# Patient Record
Sex: MALE | Race: WHITE
[De-identification: names, ages, dates, MRNs, and addresses within clinical notes are randomized per-mention and may not be internally consistent; named-entity substitution may affect disease eponyms.]

---

## 2021-05-24 ENCOUNTER — HOSPITAL ENCOUNTER (INPATIENT)
Dept: HOSPITAL 47 - EC | Age: 71
LOS: 2 days | Discharge: HOME | DRG: 309 | End: 2021-05-26
Attending: INTERNAL MEDICINE | Admitting: INTERNAL MEDICINE
Payer: MEDICARE

## 2021-05-24 DIAGNOSIS — Z85.01: ICD-10-CM

## 2021-05-24 DIAGNOSIS — R06.09: ICD-10-CM

## 2021-05-24 DIAGNOSIS — Z86.73: ICD-10-CM

## 2021-05-24 DIAGNOSIS — C15.9: ICD-10-CM

## 2021-05-24 DIAGNOSIS — Z92.3: ICD-10-CM

## 2021-05-24 DIAGNOSIS — Z79.899: ICD-10-CM

## 2021-05-24 DIAGNOSIS — R06.00: ICD-10-CM

## 2021-05-24 DIAGNOSIS — Z85.820: ICD-10-CM

## 2021-05-24 DIAGNOSIS — I48.21: Primary | ICD-10-CM

## 2021-05-24 DIAGNOSIS — E78.5: ICD-10-CM

## 2021-05-24 DIAGNOSIS — I10: ICD-10-CM

## 2021-05-24 DIAGNOSIS — I51.3: ICD-10-CM

## 2021-05-24 DIAGNOSIS — K21.9: ICD-10-CM

## 2021-05-24 DIAGNOSIS — Z85.6: ICD-10-CM

## 2021-05-24 DIAGNOSIS — Q85.00: ICD-10-CM

## 2021-05-24 DIAGNOSIS — Z20.822: ICD-10-CM

## 2021-05-24 DIAGNOSIS — Z79.01: ICD-10-CM

## 2021-05-24 LAB
ALBUMIN SERPL-MCNC: 3.5 G/DL (ref 3.5–5)
ALP SERPL-CCNC: 99 U/L (ref 38–126)
ALT SERPL-CCNC: 9 U/L (ref 4–49)
ANION GAP SERPL CALC-SCNC: 7 MMOL/L
APTT BLD: 26.5 SEC (ref 22–30)
AST SERPL-CCNC: 18 U/L (ref 17–59)
BASOPHILS # BLD MANUAL: 0.1 K/UL (ref 0–0.2)
BUN SERPL-SCNC: 11 MG/DL (ref 9–20)
CALCIUM SPEC-MCNC: 9 MG/DL (ref 8.4–10.2)
CELLS COUNTED: 100
CHLORIDE SERPL-SCNC: 102 MMOL/L (ref 98–107)
CO2 SERPL-SCNC: 25 MMOL/L (ref 22–30)
D DIMER PPP FEU-MCNC: 0.41 MG/L FEU (ref ?–0.6)
EOSINOPHIL # BLD MANUAL: 0.62 K/UL (ref 0–0.7)
ERYTHROCYTE [DISTWIDTH] IN BLOOD BY AUTOMATED COUNT: 3.59 M/UL (ref 4.3–5.9)
ERYTHROCYTE [DISTWIDTH] IN BLOOD: 16.3 % (ref 11.5–15.5)
GLUCOSE SERPL-MCNC: 111 MG/DL (ref 74–99)
HCT VFR BLD AUTO: 32.2 % (ref 39–53)
HGB BLD-MCNC: 10.8 GM/DL (ref 13–17.5)
INR PPP: 1 (ref ?–1.2)
LYMPHOCYTES # BLD MANUAL: 2.58 K/UL (ref 1–4.8)
MCH RBC QN AUTO: 30.1 PG (ref 25–35)
MCHC RBC AUTO-ENTMCNC: 33.6 G/DL (ref 31–37)
MCV RBC AUTO: 89.7 FL (ref 80–100)
MONOCYTES # BLD MANUAL: 0.31 K/UL (ref 0–1)
NEUTROPHILS NFR BLD MANUAL: 61 %
NEUTS SEG # BLD MANUAL: 6.6 K/UL (ref 1.3–7.7)
PLATELET # BLD AUTO: 251 K/UL (ref 150–450)
POTASSIUM SERPL-SCNC: 3.9 MMOL/L (ref 3.5–5.1)
PROT SERPL-MCNC: 6.1 G/DL (ref 6.3–8.2)
PT BLD: 10.9 SEC (ref 9–12)
SODIUM SERPL-SCNC: 134 MMOL/L (ref 137–145)
WBC # BLD AUTO: 10.3 K/UL (ref 3.8–10.6)

## 2021-05-24 PROCEDURE — 85379 FIBRIN DEGRADATION QUANT: CPT

## 2021-05-24 PROCEDURE — 85025 COMPLETE CBC W/AUTO DIFF WBC: CPT

## 2021-05-24 PROCEDURE — 85045 AUTOMATED RETICULOCYTE COUNT: CPT

## 2021-05-24 PROCEDURE — 83615 LACTATE (LD) (LDH) ENZYME: CPT

## 2021-05-24 PROCEDURE — 87635 SARS-COV-2 COVID-19 AMP PRB: CPT

## 2021-05-24 PROCEDURE — 83540 ASSAY OF IRON: CPT

## 2021-05-24 PROCEDURE — 83880 ASSAY OF NATRIURETIC PEPTIDE: CPT

## 2021-05-24 PROCEDURE — 83550 IRON BINDING TEST: CPT

## 2021-05-24 PROCEDURE — 80053 COMPREHEN METABOLIC PANEL: CPT

## 2021-05-24 PROCEDURE — 93005 ELECTROCARDIOGRAM TRACING: CPT

## 2021-05-24 PROCEDURE — 80048 BASIC METABOLIC PNL TOTAL CA: CPT

## 2021-05-24 PROCEDURE — 85730 THROMBOPLASTIN TIME PARTIAL: CPT

## 2021-05-24 PROCEDURE — 82746 ASSAY OF FOLIC ACID SERUM: CPT

## 2021-05-24 PROCEDURE — 83735 ASSAY OF MAGNESIUM: CPT

## 2021-05-24 PROCEDURE — 71046 X-RAY EXAM CHEST 2 VIEWS: CPT

## 2021-05-24 PROCEDURE — 83605 ASSAY OF LACTIC ACID: CPT

## 2021-05-24 PROCEDURE — 85610 PROTHROMBIN TIME: CPT

## 2021-05-24 PROCEDURE — 36415 COLL VENOUS BLD VENIPUNCTURE: CPT

## 2021-05-24 PROCEDURE — 99285 EMERGENCY DEPT VISIT HI MDM: CPT

## 2021-05-24 PROCEDURE — 71260 CT THORAX DX C+: CPT

## 2021-05-24 PROCEDURE — 83921 ORGANIC ACID SINGLE QUANT: CPT

## 2021-05-24 PROCEDURE — 93306 TTE W/DOPPLER COMPLETE: CPT

## 2021-05-24 PROCEDURE — 82728 ASSAY OF FERRITIN: CPT

## 2021-05-24 PROCEDURE — 84484 ASSAY OF TROPONIN QUANT: CPT

## 2021-05-24 PROCEDURE — 82607 VITAMIN B-12: CPT

## 2021-05-24 PROCEDURE — 80061 LIPID PANEL: CPT

## 2021-05-24 RX ADMIN — METOPROLOL TARTRATE SCH MG: 25 TABLET, FILM COATED ORAL at 22:31

## 2021-05-24 RX ADMIN — SODIUM CHLORIDE, PRESERVATIVE FREE SCH ML: 5 INJECTION INTRAVENOUS at 22:31

## 2021-05-24 RX ADMIN — RIVAROXABAN SCH MG: 20 TABLET, FILM COATED ORAL at 22:31

## 2021-05-24 NOTE — XR
EXAMINATION TYPE: XR chest 2V

 

DATE OF EXAM: 5/24/2021

 

COMPARISON: NONE

 

HISTORY: Difficulty in breathing.

 

TECHNIQUE:  Frontal and lateral views of the chest are obtained.

 

FINDINGS: Scattered round densities project over the bilateral lungs along with the bilateral thorax 
could reflect extradural or soft tissue lesions. Cannot exclude pulmonary nodules. There is chronic p
arenchymal change without focal air space opacity, pleural effusion, or pneumothorax seen.  The cardi
ac silhouette size is upper limits of normal.   The osseous structures are demineralized.

 

IMPRESSION:  No acute process. Cannot exclude bilateral pulmonary nodules. Consider chest CT to [facility]
er evaluate.

## 2021-05-24 NOTE — ED
General Adult HPI





- General


Chief complaint: Shortness of Breath


Stated complaint: SOB


Time Seen by Provider: 05/24/21 15:25


Source: patient, family, RN notes reviewed


Mode of arrival: wheelchair


Limitations: no limitations





- History of Present Illness


Initial comments: 





Patient is a pleasant 70-year-old male presenting to the emergency department 

with difficulty breathing.  Onset of symptoms was yesterday.  Symptoms are 

present with exertion.  No symptoms at rest.  No cough.  No fever.  No chest 

pain.  No leg pain or leg swelling.  Patient does have history of similar 

symptoms previously however is unclear why.  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

geal cancer with recent chemo and radiation.  Recent follow-up revealed little 

or no remaining cancer.  Patient did have a recent flight from [place] 1 week

ago.  Patient is able to walk 10 or 20 feet at this time before having to stop 

from dyspnea and exhaustion.





- Related Data


                                    Allergies











Allergy/AdvReac Type Severity Reaction Status Date / Time


 


No Known Allergies Allergy   Verified 05/24/21 14:49














Review of Systems


ROS Statement: 


Those systems with pertinent positive or pertinent negative responses have been 

documented in the HPI.





ROS Other: All systems not noted in ROS Statement are negative.


Constitutional: Denies: fever


Eyes: Denies: eye pain


ENT: Denies: ear pain


Respiratory: Reports: dyspnea.  Denies: cough


Cardiovascular: Denies: chest pain


Endocrine: Reports: fatigue


Gastrointestinal: Denies: abdominal pain


Genitourinary: Denies: dysuria


Musculoskeletal: Denies: back pain


Skin: Denies: rash


Neurological: Denies: weakness





Past Medical History


Past Medical History: Atrial Fibrillation, Cancer, CVA/TIA


Additional Past Medical History / Comment(s): neurofibormytosis, swallowing 

disorder, esophagial cancer, clots in left atrial appendage, CLL


History of Any Multi-Drug Resistant Organisms: None Reported


Past Surgical History: Bowel Resection


Additional Past Surgical History / Comment(s): peg tube, throat surgery for 

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


Past Psychological History: No Psychological Hx Reported


Smoking Status: Never smoker


Past Alcohol Use History: Occasional


Past Drug Use History: None Reported





General Exam


Limitations: no limitations


General appearance: alert, in no apparent distress


Head exam: Present: normocephalic


Eye exam: Present: normal appearance


Neck exam: Present: normal inspection


Respiratory exam: Present: normal lung sounds bilaterally


Cardiovascular Exam: Present: irregular rhythm


GI/Abdominal exam: Present: soft.  Absent: distended, tenderness


Extremities exam: Present: normal inspection.  Absent: pedal edema, calf 

tenderness


Neurological exam: Present: alert


Psychiatric exam: Present: normal affect, normal mood


Skin exam: Present: other (Diffuse skin nodules consistent assistant with 

patient's history of neurofibromatosis)





Course


                                   Vital Signs











  05/24/21 05/24/21 05/24/21





  14:49 16:00 16:38


 


Temperature 97.5 F L  


 


Pulse Rate 75 69 54 L


 


Respiratory 18 18 18





Rate   


 


Blood Pressure 96/60 109/71 105/70


 


O2 Sat by Pulse 99 100 100





Oximetry   














  05/24/21 05/24/21





  16:57 17:57


 


Temperature  


 


Pulse Rate  81


 


Respiratory 18 18





Rate  


 


Blood Pressure  107/86


 


O2 Sat by Pulse  100





Oximetry  














EKG Findings





- EKG Comments:


EKG Findings:: A. fib with rate of 79.  QRS 82.  .  .  Normal axis.

 Normal QRS.  ST depression V2 through V6 as well as some depression inferior.





Medical Decision Making





- Medical Decision Making





Patient reevaluated.  Patient and family updated.  Case discussed with Dr. Fuller, who will admit covering for hospital call.





- Lab Data


Result diagrams: 


                                 05/24/21 16:00





                                 05/24/21 16:00


                                   Lab Results











  05/24/21 05/24/21 05/24/21 Range/Units





  16:00 16:00 16:00 


 


WBC  10.3    (3.8-10.6)  k/uL


 


RBC  3.59 L    (4.30-5.90)  m/uL


 


Hgb  10.8 L    (13.0-17.5)  gm/dL


 


Hct  32.2 L    (39.0-53.0)  %


 


MCV  89.7    (80.0-100.0)  fL


 


MCH  30.1    (25.0-35.0)  pg


 


MCHC  33.6    (31.0-37.0)  g/dL


 


RDW  16.3 H    (11.5-15.5)  %


 


Plt Count  251    (150-450)  k/uL


 


MPV  6.6    


 


Anisocytosis  Slight    


 


PT   10.9   (9.0-12.0)  sec


 


INR   1.0   (<1.2)  


 


APTT   26.5   (22.0-30.0)  sec


 


D-Dimer   0.41   (<0.60)  mg/L FEU


 


Sodium    134 L  (137-145)  mmol/L


 


Potassium    3.9  (3.5-5.1)  mmol/L


 


Chloride    102  ()  mmol/L


 


Carbon Dioxide    25  (22-30)  mmol/L


 


Anion Gap    7  mmol/L


 


BUN    11  (9-20)  mg/dL


 


Creatinine    1.16  (0.66-1.25)  mg/dL


 


Est GFR (CKD-EPI)AfAm    73  (>60 ml/min/1.73 sqM)  


 


Est GFR (CKD-EPI)NonAf    64  (>60 ml/min/1.73 sqM)  


 


Glucose    111 H  (74-99)  mg/dL


 


Plasma Lactic Acid Leandro     (0.7-2.0)  mmol/L


 


Calcium    9.0  (8.4-10.2)  mg/dL


 


Total Bilirubin    0.6  (0.2-1.3)  mg/dL


 


AST    18  (17-59)  U/L


 


ALT    9  (4-49)  U/L


 


Alkaline Phosphatase    99  ()  U/L


 


Troponin I     (0.000-0.034)  ng/mL


 


NT-Pro-B Natriuret Pep     pg/mL


 


Total Protein    6.1 L  (6.3-8.2)  g/dL


 


Albumin    3.5  (3.5-5.0)  g/dL


 


Coronavirus (PCR)     (Not Detectd)  














  05/24/21 05/24/21 05/24/21 Range/Units





  16:00 16:00 16:00 


 


WBC     (3.8-10.6)  k/uL


 


RBC     (4.30-5.90)  m/uL


 


Hgb     (13.0-17.5)  gm/dL


 


Hct     (39.0-53.0)  %


 


MCV     (80.0-100.0)  fL


 


MCH     (25.0-35.0)  pg


 


MCHC     (31.0-37.0)  g/dL


 


RDW     (11.5-15.5)  %


 


Plt Count     (150-450)  k/uL


 


MPV     


 


Anisocytosis     


 


PT     (9.0-12.0)  sec


 


INR     (<1.2)  


 


APTT     (22.0-30.0)  sec


 


D-Dimer     (<0.60)  mg/L FEU


 


Sodium     (137-145)  mmol/L


 


Potassium     (3.5-5.1)  mmol/L


 


Chloride     ()  mmol/L


 


Carbon Dioxide     (22-30)  mmol/L


 


Anion Gap     mmol/L


 


BUN     (9-20)  mg/dL


 


Creatinine     (0.66-1.25)  mg/dL


 


Est GFR (CKD-EPI)AfAm     (>60 ml/min/1.73 sqM)  


 


Est GFR (CKD-EPI)NonAf     (>60 ml/min/1.73 sqM)  


 


Glucose     (74-99)  mg/dL


 


Plasma Lactic Acid Leandro  1.8    (0.7-2.0)  mmol/L


 


Calcium     (8.4-10.2)  mg/dL


 


Total Bilirubin     (0.2-1.3)  mg/dL


 


AST     (17-59)  U/L


 


ALT     (4-49)  U/L


 


Alkaline Phosphatase     ()  U/L


 


Troponin I   0.021   (0.000-0.034)  ng/mL


 


NT-Pro-B Natriuret Pep    2420  pg/mL


 


Total Protein     (6.3-8.2)  g/dL


 


Albumin     (3.5-5.0)  g/dL


 


Coronavirus (PCR)     (Not Detectd)  














  05/24/21 Range/Units





  16:00 


 


WBC   (3.8-10.6)  k/uL


 


RBC   (4.30-5.90)  m/uL


 


Hgb   (13.0-17.5)  gm/dL


 


Hct   (39.0-53.0)  %


 


MCV   (80.0-100.0)  fL


 


MCH   (25.0-35.0)  pg


 


MCHC   (31.0-37.0)  g/dL


 


RDW   (11.5-15.5)  %


 


Plt Count   (150-450)  k/uL


 


MPV   


 


Anisocytosis   


 


PT   (9.0-12.0)  sec


 


INR   (<1.2)  


 


APTT   (22.0-30.0)  sec


 


D-Dimer   (<0.60)  mg/L FEU


 


Sodium   (137-145)  mmol/L


 


Potassium   (3.5-5.1)  mmol/L


 


Chloride   ()  mmol/L


 


Carbon Dioxide   (22-30)  mmol/L


 


Anion Gap   mmol/L


 


BUN   (9-20)  mg/dL


 


Creatinine   (0.66-1.25)  mg/dL


 


Est GFR (CKD-EPI)AfAm   (>60 ml/min/1.73 sqM)  


 


Est GFR (CKD-EPI)NonAf   (>60 ml/min/1.73 sqM)  


 


Glucose   (74-99)  mg/dL


 


Plasma Lactic Acid Leandro   (0.7-2.0)  mmol/L


 


Calcium   (8.4-10.2)  mg/dL


 


Total Bilirubin   (0.2-1.3)  mg/dL


 


AST   (17-59)  U/L


 


ALT   (4-49)  U/L


 


Alkaline Phosphatase   ()  U/L


 


Troponin I   (0.000-0.034)  ng/mL


 


NT-Pro-B Natriuret Pep   pg/mL


 


Total Protein   (6.3-8.2)  g/dL


 


Albumin   (3.5-5.0)  g/dL


 


Coronavirus (PCR)  Not Detected  (Not Detectd)  














- Radiology Data


Radiology results: image reviewed (Chest x-ray shows no acute process.  

Scattered reddened densities could reflect soft tissue lesions.  Cannot exclude 

pulmonary nodules.)





Disposition


Clinical Impression: 


 Exertional dyspnea





Disposition: ADMITTED AS IP TO THIS HOSP


Is patient prescribed a controlled substance at d/c from ED?: No


Referrals: 


Nonstaff,Physician [Primary Care Provider] - 1-2 days


Decision Time: 18:01

## 2021-05-24 NOTE — P.HPIM
History of Present Illness


H&P Date: 05/24/21


Chief Complaint: dyspnea on exertion





70-year-old man with medical history of esophageal cancer, neurofibromatosis, 

permanent atrial fibrillation presented with dyspnea on exertion.  Patient is a 

poor historian and history is taken from his brother over the phone as requested

by the patient.  Patient is able to participate in review of systems however.  

From my understanding, patient has recently moved from [place] to [place].  

While he was in [place] he had been under the care of [facility], 

where he was being treated for esophageal cancer with cisplatin and radiation.  

His last therapy session was approximately 2 months ago.  Since that time, 

patient was able to walk at least 200 yards, but has since had diminished 

exercise tolerance.  It is now to the point where he can barely walk to the 

bathroom without feeling short of breath.  Based on the symptoms, he was advised

to come into the emergency room for further evaluation.  He denies fevers, chill

s, nausea, vomiting, chest pain, palpitations, syncope, presyncope, cough, 

orthopnea, lower extremity edema, abdominal pain, cost patient, diarrhea, 

numbness/weakness of extremities.





Patient recently (one week ago) moved to [place] from [place] and is still 

in the process of establishing care with new cardiology and oncology specialists

for ongoing treatment.  He does have a primary care physician appointment set up

with Dr. Jarrett's office on Monday of next week.





In the emergency room, patient was afebrile, hemodynamically stable.  CBC, 

chemistries, LFTs were largely unremarkable.  Coags were unremarkable.  D-dimer 

was normal.  Patient did have an elevated BNP to 2420.  His EKG demonstrated 

atrial fibrillation with ST depressions in the inferolateral leads.  Chest x-ray

did not appear to have any evidence of congestion.





Review of Systems





All Systems reviewed and pertinent positives and negatives noted in HPI, all 

other symptoms are negative





Past Medical History


Past Medical History: Atrial Fibrillation, Cancer, CVA/TIA


Additional Past Medical History / Comment(s): neurofibormytosis, swallowing 

disorder, esophagial cancer, clots in left atrial appendage, CLL


History of Any Multi-Drug Resistant Organisms: None Reported


Past Surgical History: Bowel Resection


Additional Past Surgical History / Comment(s): peg tube, throat surgery for 

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


Past Psychological History: No Psychological Hx Reported


Smoking Status: Never smoker


Past Alcohol Use History: Occasional


Past Drug Use History: None Reported





Medications and Allergies


                                    Allergies











Allergy/AdvReac Type Severity Reaction Status Date / Time


 


No Known Allergies Allergy   Verified 05/24/21 14:49














Physical Exam


Osteopathic Statement: *.  No significant issues noted on an osteopathic 

structural exam other than those noted in the History and Physical/Consult.


Vitals: 


                                   Vital Signs











  Temp Pulse Resp BP Pulse Ox


 


 05/24/21 17:57   81  18  107/86  100


 


 05/24/21 16:57    18  


 


 05/24/21 16:38   54 L  18  105/70  100


 


 05/24/21 16:00   69  18  109/71  100


 


 05/24/21 14:49  97.5 F L  75  18  96/60  99








                                Intake and Output











 05/24/21 05/24/21 05/24/21





 06:59 14:59 22:59


 


Other:   


 


  Weight  74.843 kg 














Gen: awake, alert


HEENT: normocephalic, atraumatic, good hearing acuity, moist mucous membranes


Resp: good air exchange, breathing comfortably with no accessory muscle use, 

clear to auscultation bilaterally without wheezes or crackles


CVS: good distal perfusion x 4, irregular rhythm, normal rate, no appreciable 

murmurs


GI: soft, NTTP, ND


: no SPT, no CVAT, benites catheter not present


MSK: no pitting edema, no clubbing, multiple neurofibromas across the entirety 

of the body


Neuro: non-focal, moving all extremities


Psych: cooperative, euthymic mood





Results


CBC & Chem 7: 


                                 05/24/21 16:00





                                 05/24/21 16:00


Labs: 


                  Abnormal Lab Results - Last 24 Hours (Table)











  05/24/21 05/24/21 Range/Units





  16:00 16:00 


 


RBC  3.59 L   (4.30-5.90)  m/uL


 


Hgb  10.8 L   (13.0-17.5)  gm/dL


 


Hct  32.2 L   (39.0-53.0)  %


 


RDW  16.3 H   (11.5-15.5)  %


 


Sodium   134 L  (137-145)  mmol/L


 


Glucose   111 H  (74-99)  mg/dL


 


Total Protein   6.1 L  (6.3-8.2)  g/dL














Assessment and Plan


Assessment: 





Dyspnea on exertion


Permanent atrial fibrillation


-Admitted to inpatient, telemetry


-Patient does appear euvolemic, we'll hold off on Lasix at this time


-Cardiology consult, patient regular medications


-Echocardiogram, pending


-Trend troponins


-EKG/nitro when necessary for chest pain


-PT/OT


-Continue digoxin, metoprolol, Xarelto





Esophageal cancer


GERD without esophagitis


-Continue Nexium


-Oncology consult, appreciate recommendations





Neurofibromatosis


Hypertension


Hyperlipidemia


-Home medications were reviewed and reconciled





Patient is a full code


Brother is next of kin and decision maker

## 2021-05-25 LAB
ANION GAP SERPL CALC-SCNC: 11.9 MMOL/L (ref 4–12)
BASOPHILS # BLD AUTO: 0.04 X 10*3/UL (ref 0–0.1)
BASOPHILS NFR BLD AUTO: 0.4 %
BUN SERPL-SCNC: 10 MG/DL (ref 9–27)
BUN/CREAT SERPL: 9.09 RATIO (ref 12–20)
CALCIUM SPEC-MCNC: 8.4 MG/DL (ref 8.7–10.3)
CHLORIDE SERPL-SCNC: 103 MMOL/L (ref 96–109)
CHOLEST SERPL-MCNC: 101 MG/DL (ref 0–200)
CO2 SERPL-SCNC: 23.1 MMOL/L (ref 21.6–31.8)
EOSINOPHIL # BLD AUTO: 0.26 X 10*3/UL (ref 0.04–0.35)
EOSINOPHIL NFR BLD AUTO: 2.6 %
ERYTHROCYTE [DISTWIDTH] IN BLOOD BY AUTOMATED COUNT: 3.12 X 10*6/UL (ref 4.4–5.6)
ERYTHROCYTE [DISTWIDTH] IN BLOOD: 16 % (ref 11.5–14.5)
GLUCOSE SERPL-MCNC: 91 MG/DL (ref 70–110)
HCT VFR BLD AUTO: 28.9 % (ref 39.6–50)
HDLC SERPL-MCNC: 19 MG/DL (ref 40–60)
HGB BLD-MCNC: 9.5 G/DL (ref 13–17)
INR PPP: 1.21 (ref 0.9–1.11)
LDLC SERPL CALC-MCNC: 44.6 MG/DL (ref 0–131)
LYMPHOCYTES # SPEC AUTO: 2.02 X 10*3/UL (ref 0.9–5)
LYMPHOCYTES NFR SPEC AUTO: 20.4 %
MAGNESIUM SPEC-SCNC: 1.6 MG/DL (ref 1.5–2.4)
MCH RBC QN AUTO: 30.4 PG (ref 27–32)
MCHC RBC AUTO-ENTMCNC: 32.9 G/DL (ref 32–37)
MCV RBC AUTO: 92.6 FL (ref 80–97)
MONOCYTES # BLD AUTO: 2.45 X 10*3/UL (ref 0.2–1)
MONOCYTES NFR BLD AUTO: 24.8 %
NEUTROPHILS # BLD AUTO: 5.08 X 10*3/UL (ref 1.8–7.7)
NEUTROPHILS NFR BLD AUTO: 51.5 %
PLATELET # BLD AUTO: 214 X 10*3/UL (ref 140–440)
POTASSIUM SERPL-SCNC: 3.9 MMOL/L (ref 3.5–5.5)
PT BLD: 13 SEC (ref 9.9–11.9)
SODIUM SERPL-SCNC: 138 MMOL/L (ref 135–145)
TRIGL SERPL-MCNC: 187 MG/DL (ref 0–149)
VLDLC SERPL CALC-MCNC: 37.4 MG/DL (ref 5–40)
WBC # BLD AUTO: 9.88 X 10*3/UL (ref 4.5–10)

## 2021-05-25 RX ADMIN — PANTOPRAZOLE SODIUM SCH MG: 40 TABLET, DELAYED RELEASE ORAL at 07:53

## 2021-05-25 RX ADMIN — SODIUM CHLORIDE, PRESERVATIVE FREE SCH ML: 5 INJECTION INTRAVENOUS at 21:35

## 2021-05-25 RX ADMIN — ATORVASTATIN CALCIUM SCH MG: 80 TABLET, FILM COATED ORAL at 07:53

## 2021-05-25 RX ADMIN — RIVAROXABAN SCH MG: 20 TABLET, FILM COATED ORAL at 17:20

## 2021-05-25 RX ADMIN — SODIUM CHLORIDE, PRESERVATIVE FREE SCH ML: 5 INJECTION INTRAVENOUS at 07:54

## 2021-05-25 RX ADMIN — METOPROLOL TARTRATE SCH MG: 25 TABLET, FILM COATED ORAL at 21:34

## 2021-05-25 RX ADMIN — METOPROLOL TARTRATE SCH MG: 25 TABLET, FILM COATED ORAL at 09:54

## 2021-05-25 RX ADMIN — ESCITALOPRAM SCH MG: 5 TABLET, FILM COATED ORAL at 07:53

## 2021-05-25 NOTE — P.CONS
History of Present Illness





- Reason for Consult


Consult date: 05/25/21


Esophageal Cancer, 


Requesting physician: Jf Fuller





- History of Present Illness





Mr. Ayoub is a patient who recently moved from out of [facility] to [place]. He

was under the care of medical and radiation oncology for esophageal cancer - 

status post treatment with concurrent radiation and chemo which apparently ended

in March per patient. We have been asked to further evaluate for establishing 

care. 





Review of Systems


All systems: negative


Constitutional: Reports as per HPI





Past Medical History


Past Medical History: Atrial Fibrillation, Cancer, CVA/TIA


Additional Past Medical History / Comment(s): neurofibormytosis, swallowing 

disorder, esophagial cancer, clots in left atrial appendage, CLL


History of Any Multi-Drug Resistant Organisms: None Reported


Past Surgical History: Bowel Resection


Additional Past Surgical History / Comment(s): peg tube, throat surgery for 

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


Past Psychological History: No Psychological Hx Reported


Smoking Status: Never smoker


Past Alcohol Use History: Occasional


Past Drug Use History: None Reported





Medications and Allergies


                                Home Medications











 Medication  Instructions  Recorded  Confirmed  Type


 


Atorvastatin [Lipitor] 80 mg PO HS 05/24/21 05/24/21 History


 


Digoxin [Lanoxin] 125 mcg PO HS 05/24/21 05/24/21 History


 


Escitalopram Oxalate [Lexapro] 5 mg PEG/G-TUBE DAILY 05/24/21 05/24/21 History


 


Esomeprazole Magnesium [NexIUM] 20 mg PO DAILY 05/24/21 05/24/21 History


 


Metoprolol Tartrate [Lopressor] 50 mg PEG/G-TUBE BID 05/24/21 05/24/21 History


 


Potassium Chloride [Klor-Con 20 20 meq PEG/G-TUBE DAILY 05/24/21 05/24/21 

History





Packets]    


 


Rivaroxaban [Xarelto] 20 mg PEG/G-TUBE HS 05/24/21 05/24/21 History


 


Tamsulosin HCl [Flomax] 0.4 mg PO DAILY 05/24/21 05/24/21 History








                                    Allergies











Allergy/AdvReac Type Severity Reaction Status Date / Time


 


No Known Allergies Allergy   Verified 05/24/21 20:27














Physical Exam


Vitals: 


                                   Vital Signs











  Temp Pulse Pulse Resp BP BP Pulse Ox


 


 05/25/21 06:39  98.0 F   51 L  16   103/65  100


 


 05/25/21 02:00     16   


 


 05/25/21 01:22  97.9 F   74  18   110/67  100


 


 05/24/21 20:50  98.1 F   57 L  16   129/71  100


 


 05/24/21 20:33  97.5 F L  67   18  108/65   100


 


 05/24/21 20:06   67   18  108/65   100


 


 05/24/21 20:00     16   


 


 05/24/21 17:57   81   18  107/86   100


 


 05/24/21 16:57     18   


 


 05/24/21 16:38   54 L   18  105/70   100


 


 05/24/21 16:00   69   18  109/71   100


 


 05/24/21 14:49  97.5 F L  75   18  96/60   99








                                Intake and Output











 05/24/21 05/25/21 05/25/21





 22:59 06:59 14:59


 


Other:   


 


  Voiding Method Toilet Toilet 





 Urinal Urinal 


 


  # Voids 2 2 


 


  Weight 74.843 kg  














- Constitutional


General appearance: cooperative, no acute distress





- EENT


ENT: hard of hearing, NA/AT





- Respiratory


Respiratory: bilateral: CTA





- Cardiovascular


Rhythm: regular





- Gastrointestinal


General gastrointestinal: soft





- Integumentary





Numerous neurofibromas across body systemically. G-Tube


Integumentary: pale





- Neurologic


Neurologic: CNII-XII intact





- Musculoskeletal


Musculoskeletal: generalized weakness, strength equal bilaterally





- Psychiatric





Slow to respond, poor historian


Psychiatric: A&O x's 3





Results


CBC & Chem 7: 


                                 05/25/21 04:45





                                 05/25/21 04:45


Labs: 


                  Abnormal Lab Results - Last 24 Hours (Table)











  05/24/21 05/24/21 Range/Units





  16:00 16:00 


 


RBC  3.59 L   (4.30-5.90)  m/uL


 


Hgb  10.8 L   (13.0-17.5)  gm/dL


 


Hct  32.2 L   (39.0-53.0)  %


 


RDW  16.3 H   (11.5-15.5)  %


 


Sodium   134 L  (137-145)  mmol/L


 


Glucose   111 H  (74-99)  mg/dL


 


Total Protein   6.1 L  (6.3-8.2)  g/dL














Assessment and Plan


(1) Esophageal adenocarcinoma


Current Visit: Yes   Status: Acute   Code(s): C15.9 - MALIGNANT NEOPLASM OF 

ESOPHAGUS, UNSPECIFIED   SNOMED Code(s): 270863382


   





(2) Neurofibromatosis


Current Visit: Yes   Status: Acute   Code(s): Q85.00 - NEUROFIBROMATOSIS, 

UNSPECIFIED   SNOMED Code(s): 17460064


   


Plan: 





restaging scans as outpatient to be scheduled for August and follow-up with Dr. Warren at that time





Anemia work-up ordered, replacement of nutritional components as needed





All other points of care per primary and other specialties





Physician attest: I have completed the full history and physical and agree with 

above dictation, dictated as a ascribe.

## 2021-05-25 NOTE — ECHOF
Referral Reason:Exertional dyspnea



MEASUREMENTS

--------

HEIGHT: 182.9 cm

WEIGHT: 74.8 kg

BP: 103/65

IVSd:   1.3 cm     (0.6 - 1.1)

LVIDd:   3.8 cm     (3.9 - 5.3)

LVPWd:   1.4 cm     (0.6 - 1.1)

EDV(Teich):   61 ml

IVSs:   1.6 cm

LVIDs:   2.6 cm

LVPWs:   1.8 cm

%IVS Thck:   22 %

ESV(Teich):   25 ml

EF(Teich):   59 %

%FS:   31 %

SV(Teich):   36 ml

RVIDd:   4.2 cm     (< 3.3)

LALs A4C:   6.2 cm

LAAs A4C:   20.5 cm

LAESV A-L A4C:   58 ml

LAESV MOD A4C:   54 ml

LALs A2C:   6.1 cm

LAAs A2C:   25.0 cm

LAESV A-L A2C:   87 ml

LAESV MOD A2C:   81 ml

LAESV(A-L):   72 ml

LAESV Index (A-L):   36.55 ml/m

Ao Diam:   3.3 cm     (2.0 - 3.7)

AV Cusp:   1.7 cm     (1.5 - 2.6)

EPSS:   0.4 cm

LVOT Vmax:   0.71 m/s

LVOT maxP.04 mmHg

AV Vmax:   0.97 m/s

AV maxPG:   3.79 mmHg

TR Vmax:   2.46 m/s

TR maxP.26 mmHg

RAP:   5.00 mmHg

RVSP:   29.26 mmHg

MV EF SLOPE:   41.03 mm/s     (70 - 150)

MV EXCURSION:   15.62 mm     (> 18.000)







FINDINGS

--------

Atrial fibrillation.

This was a technically adequate study.

The left ventricular size is normal.   There is mild concentric left ventricular hypertrophy.   Overa
ll left ventricular systolic function is low-normal with, an EF between 50 - 55 %.

The right ventricle is moderate to  severely enlarged.

LA is moderately dilated 34-39 ml/m2

The right atrial size is normal.

Interatrial and interventricular septum intact.

There is mild aortic valve sclerosis.   There is no evidence of aortic regurgitation.   There is no e
vidence of aortic stenosis.

Mild mitral regurgitation is present.

Mild tricuspid regurgitation present.   There is no evidence of pulmonary hypertension.   The right v
entricular systolic pressure, as measured by Doppler, is 29.26mmHg.

There is no pulmonic regurgitation present.

The aortic root size is normal.

IVC Not well visulized.

There is no pericardial effusion.



CONCLUSIONS

--------

1. The left ventricular size is normal.

2. There is mild concentric left ventricular hypertrophy.

3. Overall left ventricular systolic function is low-normal with, an EF between 50 - 55 %.

4. The right ventricle is moderate to  severely enlarged.

5. LA is moderately dilated 34-39 ml/m2

6. There is mild aortic valve sclerosis.

7. Mild mitral regurgitation is present.

8. Mild tricuspid regurgitation present.





SONOGRAPHER: Albertina Lugo RDCS

## 2021-05-25 NOTE — P.CRDCN
History of Present Illness


History of present illness: 


HISTORY OF PRESENTING ILLNESS


This is a pleasant 70-year-old male past medical history significant for 

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 

permanent atrial fibrillation on Xarelto, hypertension, hyperlipidemia.  He had 

a cardiologist in [place], however, recently moved to [place] and is trying 

to obtain care here. We have been asked to see in consultation for exertional 

dyspnea.  He was also treated under the care of [facility], where he has 

been treated with radiation and cisplatin.  His last therapy was felt to months 

ago.  He states since that time he's been having increased dyspnea on exertion. 

He states he even gets short of breath just brushing his teeth.  He also has 

complaints of palpitations, feels like his heart is racing, dizziness, 

lightheadedness.  He states his symptoms have been getting worse so he decided 

to come to the emergency room for further evaluation.  He denies any chest pain,

syncope, presyncope, cough, abdominal pain, nausea or vomiting, lower extremity 

edema.  He denies history of MI, stroke, diabetes.  He denies family history of 

coronary artery disease.  He states he is only seen a cardiologist in [place]

for his A. fib. He denies alcohol, illicit drug, tobacco use. Current home 

cardiac medications include digoxin 125 g nightly, Xarelto 20 mg nightly, 

atorvastatin 80 mg nightly, potassium chloride 20 mEq daily, metoprolol titrate 

50 mg twice a day. 





DIAGNOSTICS


EKG reveals atrial fibrillation, heart rate 79, ST depression in the anterior 

lateral leads.  No prior EKG to compare.


Telemetry tracings indicate patient atrial fibrillation heart rate 50 to 70s.  

Does have pauses on the monitor, all are less than 3 seconds.


Chest xray No acute cardiopulmonary process. 


Laboratory reviewed, troponins negative 3, WBC 9.88, hemoglobin 10.5, platelets

214, d-dimer negative, sodium 139, potassium 3.9, serum creatinine 1.16, BNP 

2420





REVIEW OF SYSTEMS


At the time of my exam:


CONSTITUTIONAL: Denies fever or chills.


CARDIOVASCULAR: +shortness of breath +dyspnea on exertion, +palpitations, Denies

chest pain, orthopnea, PND. 


RESPIRATORY: Denies cough. 


GASTROINTESTINAL: Denies abdominal pain, diarrhea, constipation, nausea or 

vomiting.


MUSCULOSKELETAL: Denies myalgias.


NEUROLOGIC: Denies numbness, tingling, headacbe or weakness.


ENDOCRINE: Denies fatigue, weight change,  polydipsia or polyurina.


GENITOURINARY: Denies burning, hematuria or urgency with micturation.


HEMATOLOGIC: Denies history of anemia or bleeding. 





PHYSICAL EXAMINATION


Blood pressure 103/65 heart rate 51 afebrile and maintaining oxygen saturation 

100% on room air .


CONSTITUTIONAL: No apparent distress. 


HEENT: Head is normocephalic. Pupils are equal, round. Sclerae anicteric. Mucous

membranes of the mouth are moist.  No JVD. No carotid bruit.


CHEST EXAMINATION: Lungs are clear to auscultation. No chest wall tenderness is 

noted on palpation or with deep breathing. 


HEART EXAMINATION: Regular rate and rhythm. S1, S2 heard. No murmurs, gallops or

rub.


ABDOMEN: Soft, nontender. Positive bowel sounds.


EXTREMITIES: 2+ peripheral pulses, no lower extremity edema and no calf 

tenderness.


SKIN:


NEUROLOGIC EXAMINATION: Patient is awake, alert and oriented x3. 





ASSESSMENT


Dyspnea on exertion


Chronic persitent atrial fibrillation on Xarelto


Esophageal cancer


Neurofibromatosis 


Hypertension 


Dyslipidemia 





PLAN


Obtain 2D echocardiogram and doppler study to assess cardiac structure and 

function. 


CT chest ordered 


Continue home cardiac medications


Monitor patient on telemetry 


Follow up with Dr. Schultz outpatient when discharged  





Nurse Practitioner note has been reviewed, I agree with a documented findings 

and plan of care.  Patient was seen and examined.














Past Medical History


Past Medical History: Atrial Fibrillation, Cancer, CVA/TIA


Additional Past Medical History / Comment(s): neurofibormytosis, swallowing 

disorder, esophagial cancer, clots in left atrial appendage, CLL


History of Any Multi-Drug Resistant Organisms: None Reported


Past Surgical History: Bowel Resection


Additional Past Surgical History / Comment(s): peg tube, throat surgery for can

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


Past Psychological History: No Psychological Hx Reported


Smoking Status: Never smoker


Past Alcohol Use History: Occasional


Past Drug Use History: None Reported





Medications and Allergies


                                Home Medications











 Medication  Instructions  Recorded  Confirmed  Type


 


Atorvastatin [Lipitor] 80 mg PO HS 05/24/21 05/24/21 History


 


Digoxin [Lanoxin] 125 mcg PO HS 05/24/21 05/24/21 History


 


Escitalopram Oxalate [Lexapro] 5 mg PEG/G-TUBE DAILY 05/24/21 05/24/21 History


 


Esomeprazole Magnesium [NexIUM] 20 mg PO DAILY 05/24/21 05/24/21 History


 


Metoprolol Tartrate [Lopressor] 50 mg PEG/G-TUBE BID 05/24/21 05/24/21 History


 


Potassium Chloride [Klor-Con 20 20 meq PEG/G-TUBE DAILY 05/24/21 05/24/21 

History





Packets]    


 


Rivaroxaban [Xarelto] 20 mg PEG/G-TUBE HS 05/24/21 05/24/21 History


 


Tamsulosin HCl [Flomax] 0.4 mg PO DAILY 05/24/21 05/24/21 History








                                    Allergies











Allergy/AdvReac Type Severity Reaction Status Date / Time


 


No Known Allergies Allergy   Verified 05/24/21 20:27














Physical Exam


Vitals: 


                                   Vital Signs











  Temp Pulse Pulse Resp BP BP Pulse Ox


 


 05/25/21 06:39  98.0 F   51 L  16   103/65  100


 


 05/25/21 02:00     16   


 


 05/25/21 01:22  97.9 F   74  18   110/67  100


 


 05/24/21 20:50  98.1 F   57 L  16   129/71  100


 


 05/24/21 20:33  97.5 F L  67   18  108/65   100


 


 05/24/21 20:06   67   18  108/65   100


 


 05/24/21 20:00     16   


 


 05/24/21 17:57   81   18  107/86   100


 


 05/24/21 16:57     18   


 


 05/24/21 16:38   54 L   18  105/70   100


 


 05/24/21 16:00   69   18  109/71   100


 


 05/24/21 14:49  97.5 F L  75   18  96/60   99








                                Intake and Output











 05/24/21 05/25/21 05/25/21





 22:59 06:59 14:59


 


Other:   


 


  Voiding Method Toilet Toilet Toilet





 Urinal Urinal Urinal


 


  # Voids 2 2 


 


  Weight 74.843 kg  














Results





                                 05/25/21 04:45





                                 05/24/21 16:00


                                 Cardiac Enzymes











  05/24/21 05/24/21 05/24/21 Range/Units





  16:00 16:00 19:31 


 


AST  18    (17-59)  U/L


 


Troponin I   0.021  0.021  (0.000-0.034)  ng/mL














  05/24/21 Range/Units





  22:12 


 


AST   (17-59)  U/L


 


Troponin I  0.023  (0.000-0.034)  ng/mL








                                   Coagulation











  05/24/21 05/25/21 Range/Units





  16:00 04:45 


 


PT  10.9  13.0 H  (9.0-12.0)  sec


 


APTT  26.5   (22.0-30.0)  sec








                                       CBC











  05/24/21 05/25/21 Range/Units





  16:00 04:45 


 


WBC  10.3  9.88  (3.8-10.6)  k/uL


 


RBC  3.59 L  3.12 L  (4.30-5.90)  m/uL


 


Hgb  10.8 L  9.5 L  (13.0-17.5)  gm/dL


 


Hct  32.2 L  28.9 L  (39.0-53.0)  %


 


Plt Count  251  214  (150-450)  k/uL








                          Comprehensive Metabolic Panel











  05/24/21 Range/Units





  16:00 


 


Sodium  134 L  (137-145)  mmol/L


 


Potassium  3.9  (3.5-5.1)  mmol/L


 


Chloride  102  ()  mmol/L


 


Carbon Dioxide  25  (22-30)  mmol/L


 


BUN  11  (9-20)  mg/dL


 


Creatinine  1.16  (0.66-1.25)  mg/dL


 


Glucose  111 H  (74-99)  mg/dL


 


Calcium  9.0  (8.4-10.2)  mg/dL


 


AST  18  (17-59)  U/L


 


ALT  9  (4-49)  U/L


 


Alkaline Phosphatase  99  ()  U/L


 


Total Protein  6.1 L  (6.3-8.2)  g/dL


 


Albumin  3.5  (3.5-5.0)  g/dL








                               Current Medications











Generic Name Dose Route Start Last Admin





  Trade Name Freq  PRN Reason Stop Dose Admin


 


Acetaminophen  650 mg  05/24/21 18:09 





  Acetaminophen Tab 325 Mg Tab  PO  





  Q6HR PRN  





  Mild Pain or Fever > 100.5  


 


Atorvastatin Calcium  80 mg  05/25/21 09:00  05/25/21 07:53





  Atorvastatin 80 Mg Tab  PO   80 mg





  DAILY ELAINA   Administration


 


Digoxin  125 mcg  05/25/21 21:00 





  Digoxin 125 Mcg Tab  PO  





  HS ELAINA  


 


Escitalopram Oxalate  5 mg  05/25/21 09:00  05/25/21 07:53





  Escitalopram 5 Mg Tab  PO   5 mg





  DAILY ELAINA   Administration


 


Metoprolol Tartrate  25 mg  05/24/21 21:00  05/25/21 09:54





  Metoprolol Tartrate 25 Mg Tab  PO   25 mg





  BID ELAINA   Administration


 


Miscellaneous Information  1 each  05/25/21 10:33 





  Rx Info: Iv Contrast Was Given 1 Each Misc  MISCELLANE  05/27/21 10:34 





  DAILY PRN  





  Per Protocol  


 


Naloxone HCl  0.2 mg  05/24/21 18:04 





  Naloxone 0.4 Mg/Ml 1 Ml Vial  IV  





  Q2M PRN  





  Opioid Reversal  


 


Nitroglycerin  0.4 mg  05/24/21 18:03 





  Nitroglycerin Sl Tabs 0.4 Mg Tab  SUBLINGUAL  





  Q5M PRN  





  Chest Pain  


 


Pantoprazole Sodium  40 mg  05/25/21 07:30  05/25/21 07:53





  Pantoprazole 40 Mg Tablet  PO   40 mg





  AC-BRKFST ELAINA   Administration


 


Rivaroxaban  20 mg  05/24/21 18:30  05/24/21 22:31





  Rivaroxaban 20 Mg Tab  PO   20 mg





  W/SUPPER ELAINA   Administration


 


Sodium Chloride  10 ml  05/24/21 21:00  05/25/21 07:54





  Sodium Chloride 0.9% Flush 10 Ml Syringe  IV   10 ml





  BID ELAINA   Administration








                                Intake and Output











 05/24/21 05/25/21 05/25/21





 22:59 06:59 14:59


 


Other:   


 


  Voiding Method Toilet Toilet Toilet





 Urinal Urinal Urinal


 


  # Voids 2 2 


 


  Weight 74.843 kg  








                                        





                                 05/25/21 04:45 





                                 05/24/21 16:00

## 2021-05-25 NOTE — P.PN
Subjective


Progress Note Date: 05/25/21





No new complaints today.  Does not have any shortness of breath at baseline.  

Appears euvolemic.  No chest pain.  Good appetite.





Objective





- Vital Signs


Vital signs: 


                                   Vital Signs











Temp  98.0 F   05/25/21 06:39


 


Pulse  51 L  05/25/21 06:39


 


Resp  16   05/25/21 06:39


 


BP  103/65   05/25/21 06:39


 


Pulse Ox  100   05/25/21 06:39








                                 Intake & Output











 05/24/21 05/25/21 05/25/21





 18:59 06:59 18:59


 


Weight 74.843 kg  


 


Other:   


 


  Voiding Method  Toilet Toilet





  Urinal Urinal


 


  # Voids  2 














- Exam





Gen: awake, alert


HEENT: normocephalic, atraumatic, good hearing acuity, moist mucous membranes


Resp: good air exchange, breathing comfortably with no accessory muscle use


CVS: good distal perfusion x 4,


GI: soft, NTTP, ND


: no SPT, no CVAT, benites catheter not present


MSK: no pitting edema, no clubbing


Neuro: non-focal, moving all extremities


Psych: cooperative, euthymic mood





- Labs


CBC & Chem 7: 


                                 05/25/21 04:45





                                 05/24/21 16:00


Labs: 


                  Abnormal Lab Results - Last 24 Hours (Table)











  05/24/21 05/24/21 05/25/21 Range/Units





  16:00 16:00 04:45 


 


RBC  3.59 L   3.12 L  (4.30-5.90)  m/uL


 


Hgb  10.8 L   9.5 L  (13.0-17.5)  gm/dL


 


Hct  32.2 L   28.9 L  (39.0-53.0)  %


 


RDW  16.3 H   16.0 H  (11.5-15.5)  %


 


MPV    9.4 L  (9.5-12.2)  fL


 


PT     (9.9-11.9)  sec


 


INR     (0.90-1.11)  


 


Sodium   134 L   (137-145)  mmol/L


 


Glucose   111 H   (74-99)  mg/dL


 


Total Protein   6.1 L   (6.3-8.2)  g/dL














  05/25/21 Range/Units





  04:45 


 


RBC   (4.30-5.90)  m/uL


 


Hgb   (13.0-17.5)  gm/dL


 


Hct   (39.0-53.0)  %


 


RDW   (11.5-15.5)  %


 


MPV   (9.5-12.2)  fL


 


PT  13.0 H  (9.9-11.9)  sec


 


INR  1.21 H  (0.90-1.11)  


 


Sodium   (137-145)  mmol/L


 


Glucose   (74-99)  mg/dL


 


Total Protein   (6.3-8.2)  g/dL














Assessment and Plan


Assessment: 





Dyspnea on exertion


Permanent atrial fibrillation


-Admitted to inpatient, telemetry


-Patient does appear euvolemic, we'll hold off on Lasix at this time


-Cardiology consult, continue regular medications


-Echocardiogram, pending


-Trend troponins


-EKG/nitro when necessary for chest pain


-PT/OT


-Continue digoxin, metoprolol, Xarelto


-CT Chest with contrast to rule out radiation related fibrosis causing BLACK


-outpatient PFTs





Esophageal cancer


GERD without esophagitis


-Continue Nexium


-Oncology consult, appreciate recommendations





Neurofibromatosis


Hypertension


Hyperlipidemia


-Home medications were reviewed and reconciled





Patient is a full code


Brother is next of kin and decision maker

## 2021-05-25 NOTE — CT
EXAMINATION TYPE: CT chest w con

 

DATE OF EXAM: 5/25/2021

 

COMPARISON: None

 

HISTORY: Dyspnea on exertion.

 

CT DLP: 510 mGycm

Automated exposure control for dose reduction was used.

 

CONTRAST: 

CT scan of the chest is performed with IV Contrast, patient injected with 80ml mL of Isovue 300.

 

FINDINGS:

 

LUNGS: Nonspecific nodular densities in the right upper lobe measuring 4 and 4.8 mm respectively. No 
evidence for airspace consolidation or volume loss. No pleural effusion. No mass identified.

 

 MEDIASTINUM: There are no greater than 1 cm hilar or mediastinal lymph nodes.   No pericardial effus
ion is seen.  Thoracic aorta is of normal caliber. The heart is not enlarged.

 

UPPER ABDOMEN: Distal esophageal diverticulum suggested.

 

OTHER:  Numerous skin lesions noted could reflect neurofibromatosis. Correlate clinically.

 

IMPRESSION:

Nonspecific pulmonary nodularity. Follow-up in one year advised.

## 2021-05-26 VITALS — HEART RATE: 69 BPM | TEMPERATURE: 97.8 F

## 2021-05-26 VITALS — RESPIRATION RATE: 17 BRPM | DIASTOLIC BLOOD PRESSURE: 58 MMHG | SYSTOLIC BLOOD PRESSURE: 93 MMHG

## 2021-05-26 LAB
FERRITIN SERPL-MCNC: 919.6 NG/ML (ref 22–322)
IRON SERPL-MCNC: 71 UG/DL (ref 65–175)
LDH SPEC-CCNC: 106 U/L (ref 120–246)
TIBC SERPL-MCNC: 246 UG/DL (ref 228–460)
VIT B12 SERPL-MCNC: 199 PG/ML (ref 200–944)

## 2021-05-26 RX ADMIN — ATORVASTATIN CALCIUM SCH MG: 80 TABLET, FILM COATED ORAL at 08:46

## 2021-05-26 RX ADMIN — METOPROLOL TARTRATE SCH MG: 25 TABLET, FILM COATED ORAL at 08:46

## 2021-05-26 RX ADMIN — ESCITALOPRAM SCH MG: 5 TABLET, FILM COATED ORAL at 08:46

## 2021-05-26 RX ADMIN — PANTOPRAZOLE SODIUM SCH MG: 40 TABLET, DELAYED RELEASE ORAL at 08:46

## 2021-05-26 RX ADMIN — SODIUM CHLORIDE, PRESERVATIVE FREE SCH ML: 5 INJECTION INTRAVENOUS at 08:46

## 2021-05-26 NOTE — P.PN
Subjective


HISTORY OF PRESENTING ILLNESS


This is a pleasant 70-year-old male past medical history significant for 

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 

permanent atrial fibrillation on Xarelto, hypertension, hyperlipidemia.  He had 

a cardiologist in [place], however, recently moved to [place] and is trying 

to obtain care here. We have been asked to see in consultation for exertional 

dyspnea.  He was also treated under the care of [facility], where he has 

been treated with radiation and cisplatin.  His last therapy was felt to months 

ago.  He states since that time he's been having increased dyspnea on exertion. 

He states he even gets short of breath just brushing his teeth.  He also has 

complaints of palpitations, feels like his heart is racing, dizziness, 

lightheadedness.  He states his symptoms have been getting worse so he decided 

to come to the emergency room for further evaluation.  He denies any chest pain,

syncope, presyncope, cough, abdominal pain, nausea or vomiting, lower extremity 

edema.  He denies history of MI, stroke, diabetes.  He denies family history of 

coronary artery disease.  He states he is only seen a cardiologist in [place]

for his A. fib. He denies alcohol, illicit drug, tobacco use. Current home 

cardiac medications include digoxin 125 g nightly, Xarelto 20 mg nightly, 

atorvastatin 80 mg nightly, potassium chloride 20 mEq daily, metoprolol titrate 

50 mg twice a day. 





DIAGNOSTICS


EKG reveals atrial fibrillation, heart rate 79, ST depression in the anterior 

lateral leads.  No prior EKG to compare.


Chest xray No acute cardiopulmonary process. 


Echocardiogram revealed EF 50-55%, RV is moderately to severely enlarged, LA is 

moderately dilated, mild mitral regurgitation, mild tricuspid regurgitation 


CT chest- non specific pulmonary nodularity right upper lobe. 





5/26/2021:


Patient seen and examined at bedside, no acute distress.  Laying in the bed 

comfortably. Telemetry tracings indicate patient atrial fibrillation heart rate 

50 to 70s.  Does have pauses on the monitor. One pause overnight 3 seconds long 

all other are less than 3 seconds. Pauses early morning and overnight. Currently

being maintained on atorvastatin 80 mg daily, Digoxin 125 g nightly, metoprolol

tartrate 25 mg BID





PHYSICAL EXAMINATION


Blood pressure 103/65 heart rate 51 afebrile and maintaining oxygen saturation 

100% on room air .


CONSTITUTIONAL: No apparent distress. 


HEENT: Head is normocephalic.  No JVD. No carotid bruit.


CHEST EXAMINATION: Lungs are clear to auscultation. No chest wall tenderness is 

noted on palpation or with deep breathing. 


HEART EXAMINATION: Regular rate and rhythm. S1, S2 heard. No murmurs, gallops or

rub.


ABDOMEN: Soft, nontender. Positive bowel sounds.


EXTREMITIES: 2+ peripheral pulses, no lower extremity edema and no calf 

tenderness.


SKIN: neurofibromatosis


NEUROLOGIC EXAMINATION: Patient is awake, alert and oriented x3. 





ASSESSMENT


Dyspnea on exertion


Chronic persitent atrial fibrillation on Xarelto


Esophageal cancer


Neurofibromatosis 


Hypertension 


Dyslipidemia 





PLAN


Recommend discontinuing home metoprolol tartrate 50mg BID and continue the 25mg 

BID 


Continue other home cardiac medications


Follow up with Dr. Schultz outpatient when discharged  





Nurse Practitioner note has been reviewed, I agree with a documented findings 

and plan of care.  Patient was seen and examined.








Objective





- Vital Signs


Vital signs: 


                                   Vital Signs











Temp  97.8 F   05/26/21 07:00


 


Pulse  69   05/26/21 07:00


 


Resp  17   05/26/21 07:00


 


BP  93/58   05/26/21 02:00


 


Pulse Ox  100   05/26/21 07:00








                                 Intake & Output











 05/25/21 05/26/21 05/26/21





 18:59 06:59 18:59


 


Intake Total 240  472


 


Output Total  1100 


 


Balance 240 -1100 472


 


Intake:   


 


  Oral 240  472


 


Output:   


 


  Urine  1100 


 


Other:   


 


  Voiding Method Toilet Toilet Toilet





 Urinal Urinal Urinal


 


  # Voids 2 3 














- Labs


CBC & Chem 7: 


                                 05/25/21 04:45





                                 05/25/21 04:45


Labs: 


                  Abnormal Lab Results - Last 24 Hours (Table)











  05/25/21 05/26/21 05/26/21 Range/Units





  04:45 06:01 06:01 


 


Retic Count   2.36 H   (0.10-1.80)  %


 


BUN/Creatinine Ratio  9.09 L    (12.00-20.00)  Ratio


 


Calcium  8.4 L    (8.7-10.3)  mg/dL


 


Ferritin    919.6 H  (22.0-322.0)  ng/mL


 


Lactate Dehydrogenase    106 L  (120-246)  U/L


 


Triglycerides  187.0 H    (0.0-149.0)  mg/dL


 


HDL Cholesterol  19.0 L    (40.0-60.0)  mg/dL


 


Vitamin B12    199.0 L  (200.0-944.0)  pg/mL

## 2021-05-26 NOTE — P.DS
Providers


Date of admission: 


05/25/21 14:18





Expected date of discharge: 05/26/21


Attending physician: 


Jf Fuller MD





Consults: 





                                        





05/24/21 18:03


Consult Physician Urgent 


   Consulting Provider: Susanne Dietrich


   Consult Reason/Comments: Exertional dyspnea with EKG changes


   Do you want consulting provider notified?: Yes





05/24/21 18:11


Consult Physician Routine 


   Consulting Provider: Anshul Martinez


   Consult Reason/Comments: Heart Failure, AFib


   Do you want consulting provider notified?: Yes





05/24/21 18:13


Consult Physician Routine 


   Consulting Provider: Eugene Warren


   Consult Reason/Comments: Esophageal Cancer currently rec'ing Cisplatin and 

radiation


   Do you want consulting provider notified?: Yes











Primary care physician: 


Physician Nonstaff





Hospital Course: 








HPI:


70-year-old man with medical history of esophageal cancer, neurofibromatosis, 

permanent atrial fibrillation presented with dyspnea on exertion.  Patient is a 

poor historian and history is taken from his brother over the phone as requested

by the patient.  Patient is able to participate in review of systems however.  

From my understanding, patient has recently moved from [place] to [place].  

While he was in [place] he had been under the care of [facility], 

where he was being treated for esophageal cancer with cisplatin and radiation.  

His last therapy session was approximately 2 months ago.  Since that time, 

patient was able to walk at least 200 yards, but has since had diminished 

exercise tolerance.  It is now to the point where he can barely walk to the 

bathroom without feeling short of breath.  Based on the symptoms, he was advised

to come into the emergency room for further evaluation.  He denies fevers, 

chills, nausea, vomiting, chest pain, palpitations, syncope, presyncope, cough, 

orthopnea, lower extremity edema, abdominal pain, cost patient, diarrhea, 

numbness/weakness of extremities.





Patient recently (one week ago) moved to [place] from [place] and is still 

in the process of establishing care with new cardiology and oncology specialists

for ongoing treatment.  He does have a primary care physician appointment set up

with Dr. Jarrett's office on Monday of next week.





In the emergency room, patient was afebrile, hemodynamically stable.  CBC, 

chemistries, LFTs were largely unremarkable.  Coags were unremarkable.  D-dimer 

was normal.  Patient did have an elevated BNP to 2420.  His EKG demonstrated 

atrial fibrillation with ST depressions in the inferolateral leads.  Chest x-ray

did not appear to have any evidence of congestion.








 





Dyspnea on exertion


Permanent atrial fibrillation


Esophageal cancer


GERD without esophagitis


Neurofibromatosis


Hypertension


Hyperlipidemia





Patient was admitted to the hospital, cardiology and oncology consult on the 

patient.  Echocardiogram was done and did not show any evidence of heart 

failure.  Patients digoxin was down titrated from 375 g daily to 125 g daily. 

Patient was euvolemic and did not warrant any diuresis.  Patient also had a 

computed tomography scan done which did not show any parenchymal lung disease, 

did show some nodules which have to be followed up within 6 months to 1 year.  

Patient was discharged home with instructions to follow-up with pulmonary 

medicine for outpatient PFT tests to rule out early restrictive lung disease 

given history of radiation.  He will also see his new Primary care physician as 

well as oncology.








 


Assessment: 


Gen: awake, alert


HEENT: normocephalic, atraumatic, good hearing acuity, moist mucous membranes


Resp: good air exchange, breathing comfortably with no accessory muscle use, 

clear to auscultation bilaterally without wheezes or crackles


CVS: good distal perfusion x 4, irregular rhythm, normal rate, no appreciable 

murmurs


GI: soft, NTTP, ND


: no SPT, no CVAT, benites catheter not present


MSK: no pitting edema, no clubbing, multiple neurofibromas across the entirety 

of the body


Neuro: non-focal, moving all extremities


Psych: cooperative, euthymic mood


Patient Condition at Discharge: Good





Plan - Discharge Summary


Discharge Rx Participant: No


New Discharge Prescriptions: 


New


   Metoprolol Tartrate [Lopressor] 25 mg PO BID 30 Days #60 tab





Continue


   Esomeprazole Magnesium [NexIUM] 20 mg PO DAILY


   Digoxin [Lanoxin] 125 mcg PO HS


   Potassium Chloride [Klor-Con 20 Packets] 20 meq PEG/G-TUBE DAILY


   Tamsulosin HCl [Flomax] 0.4 mg PO DAILY


   Escitalopram Oxalate [Lexapro] 5 mg PEG/G-TUBE DAILY


   Atorvastatin [Lipitor] 80 mg PO HS


   Rivaroxaban [Xarelto] 20 mg PEG/G-TUBE HS





Discontinued


   Metoprolol Tartrate [Lopressor] 50 mg PEG/G-TUBE BID


Discharge Medication List





Atorvastatin [Lipitor] 80 mg PO HS 05/24/21 [History]


Digoxin [Lanoxin] 125 mcg PO HS 05/24/21 [History]


Escitalopram Oxalate [Lexapro] 5 mg PEG/G-TUBE DAILY 05/24/21 [History]


Esomeprazole Magnesium [NexIUM] 20 mg PO DAILY 05/24/21 [History]


Potassium Chloride [Klor-Con 20 Packets] 20 meq PEG/G-TUBE DAILY 05/24/21 

[History]


Rivaroxaban [Xarelto] 20 mg PEG/G-TUBE HS 05/24/21 [History]


Tamsulosin HCl [Flomax] 0.4 mg PO DAILY 05/24/21 [History]


Metoprolol Tartrate [Lopressor] 25 mg PO BID 30 Days #60 tab 05/26/21 [Rx]








Follow up Appointment(s)/Referral(s): 


Hamlet Jarrett MD [STAFF PHYSICIAN] - 


()


Damian Schultz MD [STAFF PHYSICIAN] - 1 Week (office will call with appointment )


Eugene Warren MD [STAFF PHYSICIAN] - 1 Week


Kenroy Padilla MD [STAFF PHYSICIAN] - 1 Week


Activity/Diet/Wound Care/Special Instructions: 


restaging outpatient scans to be scheduled in August with Dr Warren





activity as tolerated





diet as tolerated


Discharge Disposition: HOME SELF-CARE

## 2021-05-30 ENCOUNTER — HOSPITAL ENCOUNTER (EMERGENCY)
Dept: HOSPITAL 47 - EC | Age: 71
Discharge: HOME | End: 2021-05-30
Payer: MEDICARE

## 2021-05-30 VITALS — SYSTOLIC BLOOD PRESSURE: 117 MMHG | DIASTOLIC BLOOD PRESSURE: 84 MMHG | HEART RATE: 78 BPM | RESPIRATION RATE: 18 BRPM

## 2021-05-30 VITALS — TEMPERATURE: 97.6 F

## 2021-05-30 DIAGNOSIS — Z86.73: ICD-10-CM

## 2021-05-30 DIAGNOSIS — I48.91: ICD-10-CM

## 2021-05-30 DIAGNOSIS — Z85.820: ICD-10-CM

## 2021-05-30 DIAGNOSIS — Z93.1: ICD-10-CM

## 2021-05-30 DIAGNOSIS — R10.9: Primary | ICD-10-CM

## 2021-05-30 LAB
ALBUMIN SERPL-MCNC: 3.3 G/DL (ref 3.5–5)
ALP SERPL-CCNC: 98 U/L (ref 38–126)
ALT SERPL-CCNC: 9 U/L (ref 4–49)
AMYLASE SERPL-CCNC: 53 U/L (ref 30–110)
ANION GAP SERPL CALC-SCNC: 8 MMOL/L
AST SERPL-CCNC: 19 U/L (ref 17–59)
BASOPHILS # BLD MANUAL: 0.1 K/UL (ref 0–0.2)
BUN SERPL-SCNC: 12 MG/DL (ref 9–20)
CALCIUM SPEC-MCNC: 8.7 MG/DL (ref 8.4–10.2)
CELLS COUNTED: 100
CHLORIDE SERPL-SCNC: 100 MMOL/L (ref 98–107)
CO2 SERPL-SCNC: 27 MMOL/L (ref 22–30)
EOSINOPHIL # BLD MANUAL: 0.2 K/UL (ref 0–0.7)
ERYTHROCYTE [DISTWIDTH] IN BLOOD BY AUTOMATED COUNT: 3.22 M/UL (ref 4.3–5.9)
ERYTHROCYTE [DISTWIDTH] IN BLOOD: 15.8 % (ref 11.5–15.5)
GLUCOSE SERPL-MCNC: 96 MG/DL (ref 74–99)
HCT VFR BLD AUTO: 28.9 % (ref 39–53)
HGB BLD-MCNC: 10 GM/DL (ref 13–17.5)
LIPASE SERPL-CCNC: 61 U/L (ref 23–300)
LYMPHOCYTES # BLD MANUAL: 3.53 K/UL (ref 1–4.8)
MCH RBC QN AUTO: 31 PG (ref 25–35)
MCHC RBC AUTO-ENTMCNC: 34.5 G/DL (ref 31–37)
MCV RBC AUTO: 89.7 FL (ref 80–100)
MONOCYTES # BLD MANUAL: 0.49 K/UL (ref 0–1)
NEUTROPHILS NFR BLD MANUAL: 54 %
NEUTS SEG # BLD MANUAL: 5.4 K/UL (ref 1.3–7.7)
PLATELET # BLD AUTO: 247 K/UL (ref 150–450)
POTASSIUM SERPL-SCNC: 4.1 MMOL/L (ref 3.5–5.1)
PROT SERPL-MCNC: 5.9 G/DL (ref 6.3–8.2)
SODIUM SERPL-SCNC: 135 MMOL/L (ref 137–145)
WBC # BLD AUTO: 9.8 K/UL (ref 3.8–10.6)

## 2021-05-30 PROCEDURE — 80053 COMPREHEN METABOLIC PANEL: CPT

## 2021-05-30 PROCEDURE — 74177 CT ABD & PELVIS W/CONTRAST: CPT

## 2021-05-30 PROCEDURE — 36415 COLL VENOUS BLD VENIPUNCTURE: CPT

## 2021-05-30 PROCEDURE — 83690 ASSAY OF LIPASE: CPT

## 2021-05-30 PROCEDURE — 96361 HYDRATE IV INFUSION ADD-ON: CPT

## 2021-05-30 PROCEDURE — 82150 ASSAY OF AMYLASE: CPT

## 2021-05-30 PROCEDURE — 96374 THER/PROPH/DIAG INJ IV PUSH: CPT

## 2021-05-30 PROCEDURE — 99284 EMERGENCY DEPT VISIT MOD MDM: CPT

## 2021-05-30 PROCEDURE — 85025 COMPLETE CBC W/AUTO DIFF WBC: CPT

## 2021-05-30 NOTE — CT
EXAMINATION TYPE: CT abdomen pelvis w con

 

DATE OF EXAM: 5/30/2021

 

COMPARISON: None

 

HISTORY: Abdominal pain surrounding PEG tube.

 

CT DLP: 842.1 mGycm

Automated exposure control for dose reduction was used.

 

CONTRAST: 

Performed with IV Contrast, patient injected with 80ml mL of Isovue 300.

 

Images obtained from the diaphragm to the floor the pelvis with IV contrast.

 

FINDINGS:

There is mild subsegmental atelectasis left lung base. There is no pleural effusion.

 

Liver spleen stomach pancreas appear intact. The bile ducts are not dilated. There is gastrostomy tub
e that appears to be in good position in the body of the stomach. No evidence of any significant surr
ounding inflammatory process. 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
ofibromatosis.

 

Gallbladder appears normal. There is no adrenal mass. Kidneys show satisfactory contrast opacificatio
n. There is no hydronephrosis. Ureters are not dilated. There is no retroperitoneal adenopathy. Appen
cleopatra is posterior and appears normal. Urinary bladder wall is slightly thickened. There is enlarged pr
ostate that measures almost 6 cm. There is no inguinal hernia. There is no free fluid in the pelvis.

 

There is no mesenteric edema. There is no ascites or free air. There is no bowel obstruction. Lumbar 
vertebra have fairly normal alignment. There is no compression fracture. The posterior elements are i
ntact. There is a minimal retrolisthesis at L1-2. The bony pelvis is intact. Hip joints are intact. T
here is no hip dysplasia.

 

IMPRESSION:

Mild atherosclerotic vascular disease. Minimal subsegmental atelectasis left lung base. No acute abno
rmality of the abdomen pelvis.

## 2021-05-30 NOTE — ED
General Adult HPI





- General


Chief complaint: Abdominal Pain


Stated complaint: Feeding tube issues


Time Seen by Provider: 05/30/21 17:38


Source: patient, RN notes reviewed


Mode of arrival: ambulatory


Limitations: no limitations





- History of Present Illness


Initial comments: 





70-year-old male with a past medical history of atrial fibrillation, CVA, 

esophageal cancer presents to the emergency room for pain around PEG tube.  

Patient reports [place]  He reports 

that since that time he has had pain around it and a lump.  States it is painful

to press.  Patient states it does work but is painful.  Patient denies any 

nausea vomiting diarrhea.Patient has no other complaints at this time including 

shortness of breath, chest pain, abdominal pain, nausea or vomiting, headache, 

or visual changes.





- Related Data


                                Home Medications











 Medication  Instructions  Recorded  Confirmed


 


Atorvastatin [Lipitor] 80 mg PO HS 05/24/21 05/24/21


 


Digoxin [Lanoxin] 125 mcg PO HS 05/24/21 05/24/21


 


Escitalopram Oxalate [Lexapro] 5 mg PEG/G-TUBE DAILY 05/24/21 05/24/21


 


Esomeprazole Magnesium [NexIUM] 20 mg PO DAILY 05/24/21 05/24/21


 


Potassium Chloride [Klor-Con 20 20 meq PEG/G-TUBE DAILY 05/24/21 05/24/21





Packets]   


 


Rivaroxaban [Xarelto] 20 mg PEG/G-TUBE HS 05/24/21 05/24/21


 


Tamsulosin HCl [Flomax] 0.4 mg PO DAILY 05/24/21 05/24/21








                                  Previous Rx's











 Medication  Instructions  Recorded


 


Metoprolol Tartrate [Lopressor] 25 mg PO BID 30 Days #60 tab 05/26/21











                                    Allergies











Allergy/AdvReac Type Severity Reaction Status Date / Time


 


No Known Allergies Allergy   Verified 05/24/21 20:27














Review of Systems


ROS Statement: 


Those systems with pertinent positive or pertinent negative responses have been 

documented in the HPI.





ROS Other: All systems not noted in ROS Statement are negative.





Past Medical History


Past Medical History: Atrial Fibrillation, Cancer, CVA/TIA


Additional Past Medical History / Comment(s): neurofibormytosis, swallowing 

disorder, esophagial cancer, clots in left atrial appendage, CLL


History of Any Multi-Drug Resistant Organisms: None Reported


Past Surgical History: Bowel Resection


Additional Past Surgical History / Comment(s): peg tube, throat surgery for 

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


Past Psychological History: No Psychological Hx Reported


Smoking Status: Never smoker


Past Alcohol Use History: Occasional


Past Drug Use History: None Reported





General Exam


Limitations: no limitations


General appearance: alert, in no apparent distress


Head exam: Present: atraumatic, normocephalic, normal inspection


Eye exam: Present: normal appearance, PERRL, EOMI.  Absent: scleral icterus, 

conjunctival injection, periorbital swelling


ENT exam: Present: normal exam, mucous membranes moist


Neck exam: Present: normal inspection, full ROM.  Absent: tenderness, 

meningismus, lymphadenopathy


Respiratory exam: Present: normal lung sounds bilaterally.  Absent: respiratory 

distress, wheezes, rales, rhonchi, stridor


Cardiovascular Exam: Present: regular rate, normal rhythm, normal heart sounds. 

 Absent: systolic murmur, diastolic murmur, rubs, gallop, clicks


GI/Abdominal exam: Present: soft, tenderness (tenderness with buldge around peg 

tube), normal bowel sounds.  Absent: distended, guarding, rebound, rigid





Course


                                   Vital Signs











  05/30/21 05/30/21





  17:33 19:43


 


Temperature 97.6 F 


 


Pulse Rate 79 73


 


Respiratory 18 20





Rate  


 


Blood Pressure 121/70 120/73


 


O2 Sat by Pulse 100 97





Oximetry  














Medical Decision Making





- Medical Decision Making





Vitals are stable.  Patient has pain around PEG tube.  Does have some tenderness

 in the area.  CBC CMP unremarkable.  CT shows mild atherosclerotic vascular 

disease.  Minimal subsegmental atelectasis left lung base.  No acute abnormality

 of the abdomen or pelvis.  At this time patient should follow up outpatient 

with the surgeon.  We will give referral to a surgeon.  He should return here 

for any worsening symptoms.





- Lab Data


Result diagrams: 


                                 05/30/21 17:59





                                 05/30/21 17:59


                                   Lab Results











  05/30/21 05/30/21 Range/Units





  17:59 17:59 


 


WBC  9.8   (3.8-10.6)  k/uL


 


RBC  3.22 L   (4.30-5.90)  m/uL


 


Hgb  10.0 L   (13.0-17.5)  gm/dL


 


Hct  28.9 L   (39.0-53.0)  %


 


MCV  89.7   (80.0-100.0)  fL


 


MCH  31.0   (25.0-35.0)  pg


 


MCHC  34.5   (31.0-37.0)  g/dL


 


RDW  15.8 H   (11.5-15.5)  %


 


Plt Count  247   (150-450)  k/uL


 


MPV  6.6   


 


Neutrophils % (Manual)  54   %


 


Band Neuts % (Manual)  2   %


 


Lymphocytes % (Manual)  36   %


 


Monocytes % (Manual)  5   %


 


Eosinophils % (Manual)  2   %


 


Basophils % (Manual)  1   %


 


Neutrophils # (Manual)  5.40   (1.3-7.7)  k/uL


 


Lymphocytes # (Manual)  3.53   (1.0-4.8)  k/uL


 


Monocytes # (Manual)  0.49   (0-1.0)  k/uL


 


Eosinophils # (Manual)  0.20   (0-0.7)  k/uL


 


Basophils # (Manual)  0.10   (0-0.2)  k/uL


 


Nucleated RBCs  0   (0-0)  /100 WBC


 


Manual Slide Review  Performed   


 


Reactive Lymphocytes  Present   


 


Poikilocytosis (manual  Present   


 


Sodium   135 L  (137-145)  mmol/L


 


Potassium   4.1  (3.5-5.1)  mmol/L


 


Chloride   100  ()  mmol/L


 


Carbon Dioxide   27  (22-30)  mmol/L


 


Anion Gap   8  mmol/L


 


BUN   12  (9-20)  mg/dL


 


Creatinine   1.31 H  (0.66-1.25)  mg/dL


 


Est GFR (CKD-EPI)AfAm   63  (>60 ml/min/1.73 sqM)  


 


Est GFR (CKD-EPI)NonAf   55  (>60 ml/min/1.73 sqM)  


 


Glucose   96  (74-99)  mg/dL


 


Calcium   8.7  (8.4-10.2)  mg/dL


 


Total Bilirubin   0.3  (0.2-1.3)  mg/dL


 


AST   19  (17-59)  U/L


 


ALT   9  (4-49)  U/L


 


Alkaline Phosphatase   98  ()  U/L


 


Total Protein   5.9 L  (6.3-8.2)  g/dL


 


Albumin   3.3 L  (3.5-5.0)  g/dL


 


Amylase   53  ()  U/L


 


Lipase   61  ()  U/L














Disposition


Clinical Impression: 


 Abdominal pain





Disposition: HOME SELF-CARE


Condition: Good


Instructions (If sedation given, give patient instructions):  Abdominal Pain 

(ED)


Additional Instructions: 


Please follow-up with your surgeon.  Please return to the emergency room for any

 worsening symptoms.


Is patient prescribed a controlled substance at d/c from ED?: No


Referrals: 


Hamlet Jarrett MD [Primary Care Provider] - 1-2 days


Jeronimo Olsen MD [Medical Doctor] - 1-2 days


Time of Disposition: 20:18

## 2021-06-16 ENCOUNTER — HOSPITAL ENCOUNTER (EMERGENCY)
Dept: HOSPITAL 47 - EC | Age: 71
Discharge: HOME | End: 2021-06-16
Payer: MEDICARE

## 2021-06-16 VITALS
TEMPERATURE: 98.5 F | SYSTOLIC BLOOD PRESSURE: 110 MMHG | HEART RATE: 89 BPM | DIASTOLIC BLOOD PRESSURE: 61 MMHG | RESPIRATION RATE: 20 BRPM

## 2021-06-16 DIAGNOSIS — L03.115: Primary | ICD-10-CM

## 2021-06-16 DIAGNOSIS — I48.91: ICD-10-CM

## 2021-06-16 DIAGNOSIS — Z79.01: ICD-10-CM

## 2021-06-16 PROCEDURE — 99283 EMERGENCY DEPT VISIT LOW MDM: CPT

## 2021-06-16 NOTE — ED
Skin/Abscess/FB HPI





- General


Chief complaint: Skin/Abscess/Foreign Body


Stated complaint: Infection on Rt Ankle


Time Seen by Provider: 06/16/21 16:35


Source: patient, family


Mode of arrival: ambulatory


Limitations: no limitations





- History of Present Illness


Initial comments: 





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

with a chief complaint of an inflamed polyp.  Patient reports about 2 days ago 

was developed inflammation one of his tumors on his right leg from the 

neurofibromatosis.  Patient reports is gradually increasing severity with pain 

and erythema.  States did recommend appointment on Friday with a primary care 

physician but the pain is getting worsening visual.  Denies any fevers or 

chills.  Denies any discharge from the painful site.  States this has never 

previously occurred. 





- Related Data


                                Home Medications











 Medication  Instructions  Recorded  Confirmed


 


Atorvastatin [Lipitor] 80 mg PO HS 05/24/21 05/24/21


 


Digoxin [Lanoxin] 125 mcg PO HS 05/24/21 05/24/21


 


Escitalopram Oxalate [Lexapro] 5 mg PEG/G-TUBE DAILY 05/24/21 05/24/21


 


Esomeprazole Magnesium [NexIUM] 20 mg PO DAILY 05/24/21 05/24/21


 


Potassium Chloride [Klor-Con 20 20 meq PEG/G-TUBE DAILY 05/24/21 05/24/21





Packets]   


 


Rivaroxaban [Xarelto] 20 mg PEG/G-TUBE HS 05/24/21 05/24/21


 


Tamsulosin HCl [Flomax] 0.4 mg PO DAILY 05/24/21 05/24/21








                                  Previous Rx's











 Medication  Instructions  Recorded


 


Metoprolol Tartrate [Lopressor] 25 mg PO BID 30 Days #60 tab 05/26/21


 


Cephalexin [Keflex] 500 mg PO Q6HR #40 cap 06/16/21











                                    Allergies











Allergy/AdvReac Type Severity Reaction Status Date / Time


 


No Known Allergies Allergy   Verified 06/16/21 16:33














Review of Systems


ROS Statement: 


Those systems with pertinent positive or pertinent negative responses have been 

documented in the HPI.





ROS Other: All systems not noted in ROS Statement are negative.





Past Medical History


Past Medical History: Atrial Fibrillation, Cancer, CVA/TIA


Additional Past Medical History / Comment(s): neurofibormytosis, swallowing 

disorder, esophagial cancer, clots in left atrial appendage, CLL


History of Any Multi-Drug Resistant Organisms: None Reported


Past Surgical History: Bowel Resection


Additional Past Surgical History / Comment(s): peg tube, throat surgery for 

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


Past Psychological History: No Psychological Hx Reported


Smoking Status: Never smoker


Past Alcohol Use History: Occasional


Past Drug Use History: None Reported





General Exam


Limitations: no limitations


General appearance: alert, in no apparent distress


Head exam: Present: atraumatic, normocephalic, normal inspection


Eye exam: Present: normal appearance, PERRL, EOMI


Pupils: Present: normal accommodation


ENT exam: Present: normal exam, normal oropharynx, mucous membranes moist


Neck exam: Present: normal inspection, full ROM.  Absent: tenderness, 

lymphadenopathy


Respiratory exam: Present: normal lung sounds bilaterally.  Absent: respiratory 

distress


Cardiovascular Exam: Present: regular rate, normal rhythm, normal heart sounds. 

 Absent: systolic murmur


Extremities exam: Present: full ROM, normal capillary refill.  Absent: normal 

inspection (Erythematous tumor on the right lower extremity with mild 

surrounding overlying cellulitic skin changes.), tenderness


Back exam: Present: normal inspection, full ROM.  Absent: tenderness


Neurological exam: Present: alert, oriented X3


Psychiatric exam: Present: normal affect, normal mood


Skin exam: Present: warm, dry, intact, normal color, other (Polyp-like lesions 

throughout the whole body.)





Course


                                   Vital Signs











  06/16/21





  16:27


 


Temperature 98.5 F


 


Pulse Rate 89


 


Respiratory 20





Rate 


 


Blood Pressure 110/61


 


O2 Sat by Pulse 100





Oximetry 














Medical Decision Making





- Medical Decision Making


70-year-old male presents to the emergency department with a chief complaint of 

an inflamed polyp.  Physical examination, patient appears to have cellulitis on 

one of his tumors from his neurofibromatosis.  I will start the patient on 

Keflex.  They're to have an appointment to follow-up with the primary care 

physician in 2 days.  Return parameters were thoroughly discussed the patient is

 an attending agreeable.  Case discussed with Dr. Jackson.








Disposition


Clinical Impression: 


 Cellulitis





Disposition: HOME SELF-CARE


Condition: Stable


Instructions (If sedation given, give patient instructions):  Cellulitis (DC)


Additional Instructions: 


Please return to the Emergency Department if symptoms worsen or any other 

concerns.


Prescriptions: 


Cephalexin [Keflex] 500 mg PO Q6HR #40 cap


Is patient prescribed a controlled substance at d/c from ED?: No


Referrals: 


Hamlet Jarrett MD [Primary Care Provider] - 1-2 days


Time of Disposition: 17:23

## 2021-07-19 ENCOUNTER — HOSPITAL ENCOUNTER (EMERGENCY)
Dept: HOSPITAL 47 - EC | Age: 71
Discharge: HOME | End: 2021-07-19
Payer: MEDICARE

## 2021-07-19 VITALS
SYSTOLIC BLOOD PRESSURE: 95 MMHG | RESPIRATION RATE: 18 BRPM | DIASTOLIC BLOOD PRESSURE: 60 MMHG | TEMPERATURE: 97.6 F | HEART RATE: 81 BPM

## 2021-07-19 DIAGNOSIS — Z79.01: ICD-10-CM

## 2021-07-19 DIAGNOSIS — I48.91: ICD-10-CM

## 2021-07-19 DIAGNOSIS — K94.23: ICD-10-CM

## 2021-07-19 DIAGNOSIS — Q85.00: Primary | ICD-10-CM

## 2021-07-19 PROCEDURE — 99282 EMERGENCY DEPT VISIT SF MDM: CPT

## 2021-07-19 NOTE — ED
Recheck HPI





- General


Chief Complaint: Recheck/Abnormal Lab/Rx


Stated Complaint: G Tube Came Out


Time Seen by Provider: 07/19/21 02:02


Source: patient, RN notes reviewed, old records reviewed


Mode of arrival: ambulatory


Limitations: no limitations





- History of Present Illness


Initial Comments: 





This is a 70-year-old male DF for evaluation.  Patient has history of esophageal

cancer coming in for evaluation of PEG tube removal.  Patient has a G-tube which

was used limited supplementation was unable to eat after chemo and radiation for

esophageal cancer.  At this time patient is eating and drinking appropriately 

orally, the tibia fell out of his been in for 9 months with this point he does 

not want replaced and is questioning that is a possibility.  Patient has no 

other complaints


MD Complaint: wound re-check


-: hour(s)


Returns Today for: other (PEG tube fell out)


Symptoms Since Prior Visit: no new symptoms


Context: other (none)


Associated Symptoms: none


Treatments Prior to Arrival: other (none)





- Related Data


                                Home Medications











 Medication  Instructions  Recorded  Confirmed


 


Atorvastatin [Lipitor] 80 mg PO HS 05/24/21 05/24/21


 


Digoxin [Lanoxin] 125 mcg PO HS 05/24/21 05/24/21


 


Escitalopram Oxalate [Lexapro] 5 mg PEG/G-TUBE DAILY 05/24/21 05/24/21


 


Esomeprazole Magnesium [NexIUM] 20 mg PO DAILY 05/24/21 05/24/21


 


Potassium Chloride [Klor-Con 20 20 meq PEG/G-TUBE DAILY 05/24/21 05/24/21





Packets]   


 


Rivaroxaban [Xarelto] 20 mg PEG/G-TUBE HS 05/24/21 05/24/21


 


Tamsulosin HCl [Flomax] 0.4 mg PO DAILY 05/24/21 05/24/21








                                  Previous Rx's











 Medication  Instructions  Recorded


 


Metoprolol Tartrate [Lopressor] 25 mg PO BID 30 Days #60 tab 05/26/21


 


Cephalexin [Keflex] 500 mg PO Q6HR #40 cap 06/16/21











                                    Allergies











Allergy/AdvReac Type Severity Reaction Status Date / Time


 


No Known Allergies Allergy   Verified 07/19/21 01:50














Review of Systems


ROS Statement: 


Those systems with pertinent positive or pertinent negative responses have been 

documented in the HPI.





ROS Other: All systems not noted in ROS Statement are negative.





Past Medical History


Past Medical History: Atrial Fibrillation, Cancer, CVA/TIA


Additional Past Medical History / Comment(s): neurofibormytosis, swallowing 

disorder, esophagial cancer, clots in left atrial appendage, CLL


History of Any Multi-Drug Resistant Organisms: None Reported


Past Surgical History: Bowel Resection


Additional Past Surgical History / Comment(s): peg tube, throat surgery for 

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


Past Psychological History: No Psychological Hx Reported


Smoking Status: Never smoker


Past Alcohol Use History: Occasional


Past Drug Use History: None Reported





General Exam


Limitations: no limitations


General appearance: alert, in no apparent distress, anxious, in distress


Head exam: Present: atraumatic, normocephalic, normal inspection


Eye exam: Present: normal appearance, PERRL, EOMI.  Absent: scleral icterus, 

conjunctival injection, periorbital swelling


ENT exam: Present: normal exam, mucous membranes dry, mucous membranes moist


Neck exam: Present: normal inspection.  Absent: tenderness, meningismus, 

lymphadenopathy


Respiratory exam: Present: normal lung sounds bilaterally.  Absent: respiratory 

distress, wheezes, rales, rhonchi, stridor


Cardiovascular Exam: Present: regular rate, normal rhythm, normal heart sounds. 

 Absent: systolic murmur, diastolic murmur, rubs, gallop, clicks


GI/Abdominal exam: Present: soft, normal bowel sounds.  Absent: distended, 

tenderness, guarding, rebound, rigid


Extremities exam: Present: normal inspection, full ROM, normal capillary refill.

  Absent: tenderness, pedal edema, joint swelling, calf tenderness


Back exam: Present: normal inspection


Neurological exam: Present: alert, oriented X3, CN II-XII intact


Psychiatric exam: Present: normal affect, normal mood


Skin exam: Present: warm, dry, intact, normal color.  Absent: rash





Course


                                   Vital Signs











  07/19/21 07/19/21





  01:44 02:59


 


Temperature 97.5 F L 97.6 F


 


Pulse Rate 83 81


 


Respiratory 20 18





Rate  


 


Blood Pressure 87/58 95/60


 


O2 Sat by Pulse 99 99





Oximetry  














- Reevaluation(s)


Reevaluation #1: 





07/19/21 


Medical record is reviewed





Patient symptoms are improved here in the emergency department





Patient informed of results and questions answered





Patient is in no acute distress





Medical Decision Making





- Medical Decision Making





Anyone male who had G-tube, last night, patient does not use it he was hopefully

 does not need to replace.  He will see his oncologist today for further evalua

tion, was given replace the G-tube if needed





Disposition


Clinical Impression: 


 Neurofibromatosis, Feeding tube dysfunction





Disposition: HOME SELF-CARE


Condition: Good


Instructions (If sedation given, give patient instructions):  How to Use and 

Care for Your PEG Tube (ED), PEG Tube Insertion (DC)


Is patient prescribed a controlled substance at d/c from ED?: No


Referrals: 


Hamlet Jarrett MD [Primary Care Provider] - 1-2 days

## 2021-08-13 ENCOUNTER — HOSPITAL ENCOUNTER (OUTPATIENT)
Dept: HOSPITAL 47 - RADPETMAIN | Age: 71
Discharge: HOME | End: 2021-08-13
Attending: INTERNAL MEDICINE
Payer: MEDICARE

## 2021-08-13 DIAGNOSIS — C15.3: Primary | ICD-10-CM

## 2021-08-13 DIAGNOSIS — C91.10: ICD-10-CM

## 2021-08-13 PROCEDURE — 78815 PET IMAGE W/CT SKULL-THIGH: CPT

## 2021-08-17 NOTE — PE
EXAMINATION TYPE: PET CT fusion skull to thigh

 

DATE OF EXAM: 8/13/2021

 

COMPARISON: CT abdomen and pelvis May 30, 2021. Chest CT May 25, 2021. Outside older CT January 29, 2
021 is nondiagnostic. Looks like was performed for radiation planning.

 

HISTORY: Esophageal cancer diagnosed November 20, 2020 completed chemotherapy and radiation treatment
 in March   

 

TECHNIQUE:  Following the intravenous administration of 8.5 to mCi of F-18 FDG, whole body images are
 performed from the skull base to the midthigh.  Images are reviewed on the computer in the coronal, 
axial, and sagittal planes.  Reconstructed rotating images are created on independent workstation and
 reviewed on the computer.   A localization and attenuation correction CT is performed in conjunction
 with the PET scan. Blood glucose level equals 94.

 

SCAN: Subsequent Scan

 

FINDINGS: 

 

SKULL BASE AND NECK:  Focus of hypermetabolic uptake left posterior upper cervical muscle axial image
 31 probable postinflammatory. No additional areas of suspicious abnormal hypermetabolic uptake.

 

CHEST, MEDIASTINUM, AND HILAR REGION: New hypermetabolic left upper lobe 1.6 x 1.3 cm nodule axial im
age 85, max SUV is 4.34. 

 

Enlarging near 1.0 cm right upper lobe to a adjacent nodules axial image 89 show mild hypermetabolic 
uptake, max SUV is less than 2.5.

 

No additional areas of abnormal hypermetabolic uptake.

 

ABDOMEN AND PELVIS: Normal excretion is present. No areas of suspicious hypermetabolic uptake.

 

OSSEOUS STRUCTURES: No areas of suspicious hypermetabolic uptake.

 

OTHER CT: Focal encephalomalacia right frontal lobe extending to superior temporal lobe. Numerous aiden
mal-based nodules consistent with history of neurofibromatosis. Low lung volumes and cardiomegaly wit
h moderate to severe right atrial dilatation. Prominent pulmonary arteries suggesting underlying pulm
onary artery hypertension. Low dense lesion near distal esophagus redemonstrated possible diverticulu
m or mediastinal neurofibroma.

 

Prominent spleen. Enlarged prostate consistent with BPH. Mass effect in bladder. Moderate narrowing o
f both hip joints. L1-L2 spondylolisthesis redemonstrated.

 

IMPRESSION: New suspicious hypermetabolic left upper lobe nodule and enlarging subcentimeter right up
per lobe nodules suspicious for metastatic neoplasm.

## 2021-09-10 ENCOUNTER — HOSPITAL ENCOUNTER (EMERGENCY)
Dept: HOSPITAL 47 - EC | Age: 71
Discharge: HOME | End: 2021-09-10
Payer: MEDICARE

## 2021-09-10 VITALS — TEMPERATURE: 98.3 F | DIASTOLIC BLOOD PRESSURE: 64 MMHG | SYSTOLIC BLOOD PRESSURE: 113 MMHG | HEART RATE: 72 BPM

## 2021-09-10 VITALS — RESPIRATION RATE: 18 BRPM

## 2021-09-10 DIAGNOSIS — S01.91XA: Primary | ICD-10-CM

## 2021-09-10 DIAGNOSIS — Z79.01: ICD-10-CM
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
## 2021-09-10 DIAGNOSIS — I48.91: ICD-10-CM

## 2021-09-10 DIAGNOSIS — Z23: ICD-10-CM

## 2021-09-10 DIAGNOSIS — Z86.73: ICD-10-CM
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
## 2021-09-10 PROCEDURE — 72125 CT NECK SPINE W/O DYE: CPT

## 2021-09-10 PROCEDURE — 99284 EMERGENCY DEPT VISIT MOD MDM: CPT

## 2021-09-10 PROCEDURE — 70450 CT HEAD/BRAIN W/O DYE: CPT

## 2021-09-10 PROCEDURE — 90471 IMMUNIZATION ADMIN: CPT

## 2021-09-10 PROCEDURE — 90715 TDAP VACCINE 7 YRS/> IM: CPT

## 2021-09-10 NOTE — ED
Fall HPI





- General


Chief Complaint: Fall


Stated Complaint: Fall


Time Seen by Provider: 09/10/21 03:56


Source: patient, EMS


Mode of arrival: EMS





- History of Present Illness


MD Complaint: fall


Onset/Timin


-: hour(s)


Fall From: standing


When Fall Occurred: 1 hour PTA


Fall Witnessed: no


Place Fall Occurred: home


Loss of Consciousness: none


Prolonged Down Time?: no


Symptoms Prior to Fall: none


Location: head


Severity: mild


Context: tripped/slipped





- Related Data


                                Home Medications











 Medication  Instructions  Recorded  Confirmed


 


Atorvastatin [Lipitor] 80 mg PO HS 21


 


Digoxin [Lanoxin] 125 mcg PO HS 21


 


Escitalopram Oxalate [Lexapro] 5 mg PEG/G-TUBE DAILY 21


 


Esomeprazole Magnesium [NexIUM] 20 mg PO DAILY 21


 


Potassium Chloride [Klor-Con 20 20 meq PEG/G-TUBE DAILY 21





Packets]   


 


Rivaroxaban [Xarelto] 20 mg PEG/G-TUBE HS 21


 


Tamsulosin HCl [Flomax] 0.4 mg PO DAILY 21








                                  Previous Rx's











 Medication  Instructions  Recorded


 


Metoprolol Tartrate [Lopressor] 25 mg PO BID 30 Days #60 tab 21


 


Cephalexin [Keflex] 500 mg PO Q6HR #40 cap 21











                                    Allergies











Allergy/AdvReac Type Severity Reaction Status Date / Time


 


No Known Allergies Allergy   Verified 21 01:50














Review of Systems


ROS Statement: 


Those systems with pertinent positive or pertinent negative responses have been 

documented in the HPI.





ROS Other: All systems not noted in ROS Statement are negative.


Constitutional: Denies: fever, chills


Eyes: Denies: vision change


ENT: Denies: ear pain, epistaxis


Respiratory: Denies: cough


Cardiovascular: Denies: chest pain, palpitations, syncope


Gastrointestinal: Denies: abdominal pain, nausea, vomiting


Genitourinary: Denies: dysuria, hematuria


Musculoskeletal: Denies: back pain


Skin: Denies: rash


Neurological: Reports: headache.  Denies: weakness, numbness, paresthesias





Past Medical History


Past Medical History: Atrial Fibrillation, Cancer, CVA/TIA


Additional Past Medical History / Comment(s): neurofibormytosis, swallowing 

disorder, esophagial cancer, clots in left atrial appendage, CLL


History of Any Multi-Drug Resistant Organisms: None Reported


Past Surgical History: Bowel Resection


Additional Past Surgical History / Comment(s): peg tube, throat surgery for 

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


Past Psychological History: No Psychological Hx Reported


Smoking Status: Never smoker


Past Alcohol Use History: Occasional


Past Drug Use History: None Reported





General Exam


Limitations: no limitations


General appearance: alert, in no apparent distress


Head exam: Present: atraumatic, normocephalic


Eye exam: Present: normal appearance, PERRL, EOMI.  Absent: scleral icterus, 

conjunctival injection, nystagmus


ENT exam: Present: normal oropharynx


Neck exam: Present: normal inspection, tenderness


Respiratory exam: Present: normal lung sounds bilaterally.  Absent: respiratory 

distress, wheezes, rales, rhonchi, stridor


Cardiovascular Exam: Present: regular rate, normal rhythm, normal heart sounds. 

 Absent: systolic murmur, diastolic murmur, rubs, gallop


GI/Abdominal exam: Present: soft.  Absent: distended, tenderness, guarding, 

rebound, rigid, mass


Extremities exam: Present: normal inspection, normal capillary refill.  Absent: 

pedal edema, calf tenderness


Back exam: Present: normal inspection.  Absent: CVA tenderness (R), CVA 

tenderness (L), vertebral tenderness


Neurological exam: Present: alert, oriented X3, CN II-XII intact.  Absent: motor

 sensory deficit


Skin exam: Present: warm, dry, intact, normal color, other (Contusion).  Absent:

 rash





Course


                                   Vital Signs











  09/10/21 09/10/21





  03:47 06:00


 


Temperature 97.6 F 98.3 F


 


Pulse Rate 77 72


 


Respiratory 18 18





Rate  


 


Blood Pressure 123/75 113/64


 


O2 Sat by Pulse 100 100





Oximetry  














Disposition


Clinical Impression: 


 Fall, Laceration





Disposition: HOME SELF-CARE


Condition: Good


Instructions (If sedation given, give patient instructions):  Laceration (ED)


Is patient prescribed a controlled substance at d/c from ED?: No


Referrals: 


Hamlet Jarrett MD [Primary Care Provider] - 1-2 days

## 2021-09-10 NOTE — CT
EXAMINATION TYPE: CT brain david flores con

 

DATE OF EXAM: 9/10/2021

 

COMPARISON: None

 

HISTORY: fall.  pain

 

CT DLP: 1532.7 mGycm

Automated exposure control for dose reduction was used.

 

Images obtained of the brain and cervical spine without contrast.

 

There is cerebral cortical atrophy. There is no mass effect nor midline shift. There is no evidence o
f intracranial hemorrhage. There is large area of hypodensity involving the right posterior frontal l
obe in the right anterior temporal lobe related to old infarct and encephalomalacia. There are rounde
d densities on the scalp consistent with sebaceous cysts. There is left frontal  periorbital soft tis
raymundo swelling. There is no evidence of skull fracture. There is normal aeration of the mastoid sinuses
. There is no retro-orbital mass.

 

There is a lower cervical mild kyphotic curvature. There is degenerative disc space narrowing in the 
cervical spine at C3-4 and C5-6 and C6-7. There is mild spurring of the endplates. I see no focal bon
e destruction. There is multilevel cervical spondylotic changes. There is slight anterior subluxation
 of C5 in relation to C6 of 3 mm.

 

IMPRESSION:

Old right temporal and posterior frontal lobe infarct. No acute intracranial abnormality. Left side p
eriorbital swelling.

 

Spondylotic changes in the cervical spine with a degenerative subluxation at C5-6. No fracture seen.

## 2021-10-29 ENCOUNTER — HOSPITAL ENCOUNTER (OUTPATIENT)
Dept: HOSPITAL 47 - RADCTMAIN | Age: 71
Discharge: HOME | End: 2021-10-29
Attending: INTERNAL MEDICINE
Payer: MEDICARE

## 2021-10-29 DIAGNOSIS — Z03.89: Primary | ICD-10-CM

## 2021-10-29 DIAGNOSIS — C91.10: ICD-10-CM

## 2021-10-29 DIAGNOSIS — C15.3: ICD-10-CM

## 2021-10-29 LAB — BUN SERPL-SCNC: 14 MG/DL (ref 9–20)

## 2021-10-29 PROCEDURE — 82565 ASSAY OF CREATININE: CPT

## 2021-10-29 PROCEDURE — 36415 COLL VENOUS BLD VENIPUNCTURE: CPT

## 2021-10-29 PROCEDURE — 84520 ASSAY OF UREA NITROGEN: CPT

## 2021-10-29 PROCEDURE — 71260 CT THORAX DX C+: CPT

## 2021-10-29 PROCEDURE — 74177 CT ABD & PELVIS W/CONTRAST: CPT

## 2021-10-31 NOTE — CT
EXAMINATION TYPE: CT ChestAbdPelvis w con

 

DATE OF EXAM: 10/29/2021

 

COMPARISON: 8/13/2021, 5/13/2021

 

HISTORY: 70-year-old male C15.5, Esophageal and lung cancer.

 

TECHNIQUE: Contiguous axial scanning of the chest, abdomen, and pelvis performed with IV Contrast, pa
tient injected with 100 mL of Isovue M300. Delayed images through the kidneys were obtained. Coronal/
sagittal reconstructions performed.

 

CT DLP: 822.2 mGycm

Automated exposure control for dose reduction was used.

 

FINDINGS: 

 

CHEST:

Heart normal size with trace anterior pericardial fluid.

 

Aorta normal caliber with conventional arch was a branching anatomy.

 

Large caliber to the main right and the pulmonary arteries measuring up to 2.9 and 2.5 cm, respective
ly, suggesting underlying pulmonary arterial hypertension.

 

A focal lower right paraesophageal nodule measures 2.6 cm, unchanged.

 

Focal soft tissue overlying the right posterolateral mid chest wall just deep to the scapula measures
 4.1 cm wide. Slightly less bulky, suspected inflammatory elastofibroma dorsi.

 

Left upper lobe nodule presently 1.6 x 1.1 cm. Overall no significant change in size but small amount
 of adjacent groundglass is new.

 

There are enlarging bilateral upper lobe nodules and some groundglass nodules, for example:

-medial anterior left upper lobe measuring 8 mm versus 6 cm, previously.

-1.1 cm lateral right upper lobe versus 8 mm, previously.

-1.3 cm lateral right lower lobe versus 1.0 cm, previously.

 

There is mild centrilobular emphysema.

 

No pleural effusion.

 

 

 

ABDOMEN:

Small contrast column in the visualized mid to lower esophagus. This could represent esophageal dysmo
tility or gastroesophageal reflux.

 

No focal liver lesion identified. Portal venous system is patent. No biliary ductal dilatation.

 

Gallbladder, adrenal glands, right kidney, and pancreas show no gross modality.

 

There is a fat density cortical lesion measuring 1.2 cm lateral lower pole left kidney, unchanged. Ei
ther a cortical defect/scar or underlying AML.

 

Small wedge deformities in the spleen, possible sequela of prior vascular or traumatic insults. Small
 hilar splenule.

 

No dilated small bowel or free air.

 

Numerous mid mesenteric lymph nodes are now demonstrated measuring up to 1.0 cm.

 

Normal appendix. Oral contrast has entered to the rectum. Moderate stool in the right side of the col
on. No pericolonic inflammatory change.

 

 

PELVIS:

Bladder partially distended. Prostatomegaly at 6.1 cm wide. Left external iliac chain lymph nodes krishna
sures 1.0 cm, unchanged. No abnormal fluid collection in the pelvis.

 

 

BONES:

Innumerable neurofibromas redemonstrated. Normal variant sternal foramen. Hypertrophic facet arthropa
thy lumbar spine. Grade 1 retrolisthesis L1-L2.

 

 

 

 

IMPRESSION: 

 

1. THE DOMINANT LEFT UPPER LOBE PULMONARY NODULE IS STABLE AT 1.6 CM. HOWEVER, NUMEROUS ADDITIONAL NO
DULES AND GROUNDGLASS FOCI ARE INCREASING IN SIZE. FOR EXAMPLE, RIGHT UPPER LOBE 1.1 CM VERSUS 8 MM, 
PREVIOUSLY. RIGHT LOWER LOBE MEASURING 1.3 CM VERSUS 1.0 CM, PREVIOUSLY.

2. STABLE 2.6 CM LOWER RIGHT PARAESOPHAGEAL NODULE.

3. NEW/INCREASING MID MESENTERIC LYMPHADENOPATHY MEASURING UP TO 1.0 CM. FINDINGS MAY BE REACTIVE/POS
T INFLAMMATORY. FOLLOW-UP TO EXCLUDE METASTATIC NODES.

4. COPD WITH MILD EMPHYSEMA. PULMONARY ARTERIAL HYPERTENSION. PROSTATOMEGALY AT 6.1 CM WIDE. 

5. KNOWN NEUROFIBROMATOSIS.

## 2022-02-09 ENCOUNTER — HOSPITAL ENCOUNTER (OUTPATIENT)
Dept: HOSPITAL 47 - RADCTMAIN | Age: 72
Discharge: HOME | End: 2022-02-09
Attending: INTERNAL MEDICINE
Payer: MEDICARE

## 2022-02-09 DIAGNOSIS — C15.3: Primary | ICD-10-CM

## 2022-02-09 DIAGNOSIS — C91.10: ICD-10-CM

## 2022-02-09 DIAGNOSIS — I67.89: ICD-10-CM

## 2022-02-09 DIAGNOSIS — N18.9: ICD-10-CM

## 2022-02-09 LAB — BUN SERPL-SCNC: 18 MG/DL (ref 9–20)

## 2022-02-09 PROCEDURE — 71250 CT THORAX DX C-: CPT

## 2022-02-09 PROCEDURE — 82565 ASSAY OF CREATININE: CPT

## 2022-02-09 PROCEDURE — 84520 ASSAY OF UREA NITROGEN: CPT

## 2022-02-09 PROCEDURE — 74176 CT ABD & PELVIS W/O CONTRAST: CPT

## 2022-02-09 PROCEDURE — 36415 COLL VENOUS BLD VENIPUNCTURE: CPT

## 2022-02-09 NOTE — CT
EXAMINATION TYPE: CT ChestAbdPelvis wo con

 

DATE OF EXAM: 2/9/2022

 

COMPARISON: CT 10/29/2021

 

HISTORY: History of esophageal and lung cancer.

 

CT DLP: 1030 mGycm. Automated Exposure Control for Dose Reduction was Utilized.

 

TECHNIQUE: 

CT scan of the thorax, abdomen and pelvis is performed without IV contrast.

 

FINDINGS:

 

LUNGS: The right lower lobe lung nodule now measures 2.9 cm, measured approximately 13 mm on prior ex
am. There is a new right pleural effusion. Right upper lobe mass is measure approximately 2.4 cm ante
riorly and 2.4 cm posteriorly as compared to prior exam when they measured 13 mm and 11 mm respective
ly, some local subpleural nodularity is present at this level not seen on prior. Left upper lobe nodu
le anteriorly measured 8 mm in prior now measures 15 mm, central mass on the left measured 16 mm on p
rior and now measures 23 mm.

 

MEDIASTINUM: There is a superior mediastinal enlarged node present axial image 20 not seen on prior e
xam. Paraesophageal node enlargement is again seen near the gastroesophageal junction, prior.   No pe
ricardial effusion is seen.  

 

OTHER: Multiple neurofibromas again seen. 

 

LIVER/GB: No significant abnormality is appreciated.

 

PANCREAS: No significant abnormality is seen.

 

SPLEEN: Borderline enlarged, splenule present within the hilum, spleen shows a similar appearance.

 

ADRENALS: No significant abnormality is seen.

 

KIDNEYS: No significant abnormality is seen.

 

BOWEL:  No significant abnormality is seen.

 

GENITAL ORGANS: Prostate is enlarged. 

 

LYMPH NODES: Iliac nodes are thought to be similar accounting for differences in technique OSSEOUS ST
RUCTURES: No significant abnormality is seen.

 

OTHER: Urinary bladder shows thickened wall possibly due to chronic bladder obstruction.

 

IMPRESSION: There is been interval progression in the size of patient's lung nodules. Mediastinal bruno
nopathy

Right pleural effusion

## 2022-03-01 ENCOUNTER — HOSPITAL ENCOUNTER (OUTPATIENT)
Dept: HOSPITAL 47 - RADPROMAIN | Age: 72
Discharge: HOME | End: 2022-03-01
Attending: INTERNAL MEDICINE
Payer: MEDICARE

## 2022-03-01 VITALS — TEMPERATURE: 98.2 F | RESPIRATION RATE: 16 BRPM

## 2022-03-01 VITALS — DIASTOLIC BLOOD PRESSURE: 65 MMHG | HEART RATE: 95 BPM | SYSTOLIC BLOOD PRESSURE: 119 MMHG

## 2022-03-01 DIAGNOSIS — Q85.00: ICD-10-CM

## 2022-03-01 DIAGNOSIS — Z80.3: ICD-10-CM

## 2022-03-01 DIAGNOSIS — Z79.899: ICD-10-CM

## 2022-03-01 DIAGNOSIS — Z79.01: ICD-10-CM

## 2022-03-01 DIAGNOSIS — Z80.1: ICD-10-CM

## 2022-03-01 DIAGNOSIS — I10: ICD-10-CM

## 2022-03-01 DIAGNOSIS — C91.10: Primary | ICD-10-CM

## 2022-03-01 DIAGNOSIS — I48.91: ICD-10-CM

## 2022-03-01 DIAGNOSIS — E78.5: ICD-10-CM

## 2022-03-01 DIAGNOSIS — Z86.73: ICD-10-CM

## 2022-03-01 DIAGNOSIS — D64.9: ICD-10-CM

## 2022-03-01 DIAGNOSIS — Z85.828: ICD-10-CM

## 2022-03-01 DIAGNOSIS — Z98.890: ICD-10-CM

## 2022-03-01 DIAGNOSIS — I12.9: ICD-10-CM

## 2022-03-01 DIAGNOSIS — K57.90: ICD-10-CM

## 2022-03-01 DIAGNOSIS — Z86.19: ICD-10-CM

## 2022-03-01 DIAGNOSIS — Z80.0: ICD-10-CM

## 2022-03-01 LAB
INR PPP: 1 (ref ?–1.2)
PLATELET # BLD AUTO: 264 K/UL (ref 150–450)
PT BLD: 10.9 SEC (ref 9–12)

## 2022-03-01 PROCEDURE — 89050 BODY FLUID CELL COUNT: CPT

## 2022-03-01 PROCEDURE — 87075 CULTR BACTERIA EXCEPT BLOOD: CPT

## 2022-03-01 PROCEDURE — 36415 COLL VENOUS BLD VENIPUNCTURE: CPT

## 2022-03-01 PROCEDURE — 88342 IMHCHEM/IMCYTCHM 1ST ANTB: CPT

## 2022-03-01 PROCEDURE — 88305 TISSUE EXAM BY PATHOLOGIST: CPT

## 2022-03-01 PROCEDURE — 84157 ASSAY OF PROTEIN OTHER: CPT

## 2022-03-01 PROCEDURE — 87070 CULTURE OTHR SPECIMN AEROBIC: CPT

## 2022-03-01 PROCEDURE — 71045 X-RAY EXAM CHEST 1 VIEW: CPT

## 2022-03-01 PROCEDURE — 83615 LACTATE (LD) (LDH) ENZYME: CPT

## 2022-03-01 PROCEDURE — 88108 CYTOPATH CONCENTRATE TECH: CPT

## 2022-03-01 PROCEDURE — 85610 PROTHROMBIN TIME: CPT

## 2022-03-01 PROCEDURE — 32555 ASPIRATE PLEURA W/ IMAGING: CPT

## 2022-03-01 PROCEDURE — 87205 SMEAR GRAM STAIN: CPT

## 2022-03-01 PROCEDURE — 88341 IMHCHEM/IMCYTCHM EA ADD ANTB: CPT

## 2022-03-01 PROCEDURE — 85049 AUTOMATED PLATELET COUNT: CPT

## 2022-03-01 NOTE — XR
EXAMINATION TYPE: XR chest 1V portable

 

DATE OF EXAM: 3/1/2022

 

COMPARISON: Chest CT February 9, 2022

 

HISTORY: Right-sided thoracentesis. Distributed metastatic esophageal cancer.

 

TECHNIQUE: Single AP portable frontal upright view of the chest is obtained.

 

FINDINGS: No pneumothorax after right-sided thoracentesis. Scattered right lung nodules redemonstrate
d. There is persistent left upper lung spiculated nodule. Background mild underlying emphysematous ch
mike. Cardiac silhouette size is stable and within normal limits. Osseous structures are intact. Over
lying skin nodules redemonstrated.

 

IMPRESSION:  As above.

## 2022-03-01 NOTE — US
EXAMINATION TYPE: US thoracentesis

 

DATE OF EXAM: 3/1/2022

 

COMPARISON: CT chest 2/19/2022

 

HISTORY: Pleural effusion on the right.

 

FINDINGS: Maximal barrier technique was utilized.  The skin overlying a suitable pocket of fluid was 
localized and the overlying skin prepped and draped.  Lidocaine was used for local anesthesia. Ultras
ound was used with sterile technique.  A 5 [place] catheter over guide needle was advanced into the pl
eural fluid collection using ultrasound guidance , needle removed and catheter advanced.  Approximate
ly 0.36 liter(s) of serous fluid was removed.  Catheter was withdrawn and hemostasis achieved.  There
 is no immediate complication.  The patient discharged in stable condition without complication.

 

IMPRESSION: STATUS POST ULTRASOUND GUIDED THORACENTESIS, POST PROCEDURE CHEST X-RAY PENDING.  THIS AR
OCEDURE WAS PERFORMED BY THE UNDERSIGNED. Specimen sent for laboratory analysis.

## 2022-03-02 LAB — PROT FLD-MCNC: 3810 MG/DL

## 2022-03-11 ENCOUNTER — HOSPITAL ENCOUNTER (OUTPATIENT)
Dept: HOSPITAL 47 - RADPETMAIN | Age: 72
Discharge: HOME | End: 2022-03-11
Attending: INTERNAL MEDICINE
Payer: MEDICARE

## 2022-03-11 DIAGNOSIS — C15.3: Primary | ICD-10-CM

## 2022-03-11 PROCEDURE — 78815 PET IMAGE W/CT SKULL-THIGH: CPT

## 2022-03-14 NOTE — PE
EXAMINATION TYPE: PET CT fusion skull to thigh

 

DATE OF EXAM: 3/11/2022

 

COMPARISON: Most recent PET CT August 13, 2021. Most recent CT February 9, 2022 and older CTs.

 

HISTORY: Esophageal cancer progress study.   

 

TECHNIQUE:  Following the intravenous administration of 8.83 mCi of F-18 FDG, whole body images are p
erformed from the skull base to the midthigh.  Images are reviewed on the computer in the coronal, ax
ial, and sagittal planes.  Reconstructed rotating images are created on independent workstation and r
eviewed on the computer.   A localization and attenuation correction CT is performed in conjunction w
ith the PET scan. Blood glucose level equals 106.

 

SCAN: Subsequent Scan

 

FINDINGS: 

 

SKULL BASE AND NECK:  No new areas of suspicious abnormal hypermetabolic uptake.

 

CHEST, MEDIASTINUM, AND HILAR REGION: Enlarged left upper lobe 2.6 x 2.2 cm nodule axial image 87, ma
x SUV is 4.98 versus 4.34 on prior. There is 1.8 x 1.3 cm anterior medial hypermetabolic nodule axial
 image 89, max SUV is 7.45.

 

Small to moderate size right pleural effusion increased in size from most recent CT. There are hyperm
etabolic pleural-based nodules axial image 97, Max SUV is 7.05. There is additional hypermetabolic po
sterior pleural-based nodule axial image 116. There are additional hypermetabolic right pulmonary nod
ules, for reference there is lateral 1.8 x 1.4 cm nodule axial image 92, max SUV is 3.41. Hypermetabo
lic inferior retrocrural 1.8 x 1.2 cm mass or node axial image 147, max SUV is 5.3.

 

 ABDOMEN AND PELVIS: Normal excretion is present. No areas of new suspicious hypermetabolic uptake.

 

OSSEOUS STRUCTURES: No areas of suspicious hypermetabolic uptake.

 

OTHER CT: Focal encephalomalacia right frontal lobe extending to superior temporal lobe redemonstrate
d. Numerous dermal-based nodules consistent with history of neurofibromatosis. Low lung volumes and c
ardiomegaly with moderate to severe right atrial dilatation. Prominent pulmonary arteries suggesting 
underlying pulmonary artery hypertension. Low dense rounded well-circumscribed lesion near distal eso
phagus redemonstrated possible diverticulum or mediastinal neurofibroma.

 

Prominent spleen. Enlarged prostate consistent with BPH. Mass effect on the bladder is redemonstrated
. Moderate narrowing of both hip joints. L1-L2 spondylolisthesis redemonstrated.

 

IMPRESSION: Metastatic neoplastic progression in the thorax as detailed above.

## 2022-04-06 ENCOUNTER — HOSPITAL ENCOUNTER (OUTPATIENT)
Dept: HOSPITAL 47 - ORWHC2ENDO | Age: 72
Discharge: HOME | End: 2022-04-06
Attending: INTERNAL MEDICINE
Payer: MEDICARE

## 2022-04-06 VITALS — RESPIRATION RATE: 20 BRPM

## 2022-04-06 VITALS — HEART RATE: 68 BPM | SYSTOLIC BLOOD PRESSURE: 118 MMHG | DIASTOLIC BLOOD PRESSURE: 71 MMHG

## 2022-04-06 VITALS — TEMPERATURE: 97.1 F

## 2022-04-06 VITALS — BODY MASS INDEX: 25.9 KG/M2

## 2022-04-06 DIAGNOSIS — I48.91: ICD-10-CM

## 2022-04-06 DIAGNOSIS — R91.8: Primary | ICD-10-CM

## 2022-04-06 DIAGNOSIS — Z79.01: ICD-10-CM

## 2022-04-06 DIAGNOSIS — Z90.49: ICD-10-CM

## 2022-04-06 DIAGNOSIS — N28.9: ICD-10-CM

## 2022-04-06 DIAGNOSIS — C15.9: ICD-10-CM

## 2022-04-06 DIAGNOSIS — Z86.73: ICD-10-CM

## 2022-04-06 DIAGNOSIS — F03.90: ICD-10-CM

## 2022-04-06 DIAGNOSIS — Z98.49: ICD-10-CM

## 2022-04-06 DIAGNOSIS — Z98.890: ICD-10-CM

## 2022-04-06 DIAGNOSIS — E78.5: ICD-10-CM

## 2022-04-06 DIAGNOSIS — Z79.899: ICD-10-CM

## 2022-04-06 PROCEDURE — 31625 BRONCHOSCOPY W/BIOPSY(S): CPT

## 2022-04-06 PROCEDURE — 31628 BRONCHOSCOPY/LUNG BX EACH: CPT

## 2022-04-06 PROCEDURE — 88305 TISSUE EXAM BY PATHOLOGIST: CPT

## 2022-04-06 PROCEDURE — 71250 CT THORAX DX C-: CPT

## 2022-04-06 PROCEDURE — 31627 NAVIGATIONAL BRONCHOSCOPY: CPT

## 2022-04-06 PROCEDURE — 71045 X-RAY EXAM CHEST 1 VIEW: CPT

## 2022-04-06 PROCEDURE — 31632 BRONCHOSCOPY/LUNG BX ADDL: CPT

## 2022-04-06 NOTE — CT
EXAMINATION TYPE: CT Chest wo con Veran Protocol

 

DATE OF EXAM: 4/6/2022

 

COMPARISON: 3/11/2022

 

HISTORY: Bronchial navigation.

 

CT DLP: 676 mGycm

Automated exposure control for dose reduction was used.

 

FINDINGS: 

Numerous epidermal nodules are seen and stable from recent CT scan. There persists multiple pulmonary
 masses bilaterally measuring approximately 2.6 x 2.2 cm in the left upper lobe

 

There is a and left upper lobe anteromedial nodule measuring 2.2 x 1.8 cm. Spiculated left perihilar 
and upper lobe nodule seen measuring 2.4 x 2.1 cm. There are 2 spiculated masses in the right upper l
obe measuring approximately 1.8 and 1.4 cm respectively. A larger right lower lobe pleural-based mass
 is seen measuring 2.5 x 2.2 cm.

 

There is a right-sided area of consolidation and moderate pleural effusion. Punctate 2 mm nodule seen
 on axial image 58 of the right lung base.

 

Heart size is stable. Trace of pericardial fluid noted and there is coronary artery calcification. At
herosclerotic change aorta but no evidence of aneurysm. Small nodule seen in the prevascular space me
asuring 1.1 cm likely representing an area of adenopathy. Assessment of the hilum and mediastinum is 
limited due to lack of contrast however there appears to be a soft tissue nodule seen in the right pa
raesophageal region measuring 2.5 cm.

 

Structures of the upper abdomen are stable in appearance and there is hypertrophic and degenerative c
hanges of the spine. Nonspecific splenic calcification suspected

 

Along the left paraspinal line on image #50 there is a soft tissue nodule measuring 1.4 x 1.2 cm susp
icious for area of posterior mediastinal adenopathy or metastatic lesion.

 

Suspect there is a pleural-based nodule partially obscured by the pleural effusion measuring 2.5 x 1.
5 cm best noted on axial image 47 and 48. Additional smaller subpleural nodules are also suspected wi
thin the right lower lobe at least 4-5 additional nodules.

 

 

 

IMPRESSION:

1. Persistent bilateral pulmonary masses as noted by recent PET scan suspicious for malignancy.

2. Right paraesophageal soft tissue mass measuring 2.5 cm stable.

3. Diffuse epidermal soft tissue nodules can occasionally be associated with neurofibromatosis correl
ate clinically to exclude other etiologies.

4. Adenopathy as discussed above.

5. Right-sided consolidation and moderate pleural effusion persists. Suspect there is a pleural-based
 nodule partially obscured by the pleural effusion measuring 2.5 x 1.5 cm best noted on axial image 4
7 and 48. Additional smaller subpleural nodules are also suspected within the right lower lobe at evelio
st 4-5 additional nodules.

## 2022-04-06 NOTE — XR
EXAMINATION TYPE: XR chest 1V

 

DATE OF EXAM: 4/6/2022

 

COMPARISON: Chest CT earlier today and older studies.

 

HISTORY: Postop bronchoscopy.

 

TECHNIQUE: Single frontal view of the chest is obtained.

 

FINDINGS:  Osseous structures are demineralized. Persistent cardiomegaly with atherosclerotic thoraci
c aorta. Chronic emphysematous and parenchymal fibrotic changes with scattered suspicious nodules are
 redemonstrated. No pneumothorax seen after bronchoscopy. Bilateral Nodules noted best appreciated on
 recent PET/CT.

 

IMPRESSION:  As above.

## 2022-04-06 NOTE — P.PCN
Date of Procedure: 04/06/22


Preoperative Diagnosis: 


Multiple bilateral pulmonary nodules, rule out metastatic esophageal cancer


Postoperative Diagnosis: 


Multiple bilateral pulmonary nodules, rule out metastatic esophageal cancer


Procedure(s) Performed: 





Navigation bronchoscopy, transbronchial biopsy of the left upper lobe nodule, 

right upper node due to #1, right upper node in #2 and right lower lobe nodule.


Anesthesia: GETA


Surgeon: Kenroy Padilla


Estimated Blood Loss (ml): 0


Pathology: other


Condition: stable


Disposition: same day


Operative Findings: 


This is a procedure that was done in the endoscopy suite.  The procedure was 

done under general anesthesia.  A consent was obtained.  A timeout was done.  

Preoperatively, the patient underwent a CAT scan of the chest using the [employer] 

protocol.  The CAT scan images reviewed and the multiple bilateral pulmonary 

arteries were identified.  The images uploaded into the computer system where 

the nodules in the left upper lobe and the right upper lobe and the right lower 

lobe were all mapped and a total of 4 targets were identified.  All of this info

rmation was uploaded into the [employer] navigational tower using a yesterday.





The patient was brought into the endoscopy room.  The patient was intubated and 

placed on mechanical ventilator in the usual fashion by the anesthesia team.  

The airway was secured by a #8 endotracheal tube.  Once the patient was 

adequately oxygenated, the flexible bronchoscope was introduced through the 

endotracheal tube and an airway inspection was done.  The airway inspection was 

essentially within normal limits.  Visualized airways into the distal trachea, 

bilateral mainstem bronchi, right upper lobe bronchus, bronchus intermedius, 

right middle lobe bronchus and right lower lobe bronchus and the latest and 

segments of the right and examination of the left side including the left 

mainstem bronchus, left upper and left lower lobe bronchi and the various 8 

segments on the left.  No endobronchial lesions or abnormalities identified, and

the airway exam was essentially within normal.  Following that, appropriate 

calibration was done using the main erica and the secondary erica on the left 

as a reference points.  On her navigational guidance, transbronchial biopsies of

left upper lobe target nodule , right upper lobe target #1 nodule, right upper 

lobe target #2 nodule and the right lower lobe target nodule transbronchial 

biopsies was done.  Multiple passes were obtained.  There was no bleeding 

encountered.  No complications.  After adequate samples were collected, 

bronchoscope was removed and the patient was extubated transferred to recovery 

in stable condition.  Chest x-rays to follow to rule out pneumothorax.

## 2022-05-02 ENCOUNTER — HOSPITAL ENCOUNTER (OUTPATIENT)
Dept: HOSPITAL 47 - RADPROMAIN | Age: 72
Discharge: NURSING, REHAB OR LONG-TERM CARE | End: 2022-05-02
Attending: INTERNAL MEDICINE
Payer: MEDICARE

## 2022-05-02 VITALS — TEMPERATURE: 97.9 F

## 2022-05-02 VITALS — DIASTOLIC BLOOD PRESSURE: 78 MMHG | SYSTOLIC BLOOD PRESSURE: 122 MMHG | HEART RATE: 104 BPM

## 2022-05-02 VITALS — RESPIRATION RATE: 16 BRPM

## 2022-05-02 DIAGNOSIS — Z98.890: ICD-10-CM

## 2022-05-02 DIAGNOSIS — R91.8: Primary | ICD-10-CM

## 2022-05-02 DIAGNOSIS — Z85.01: ICD-10-CM

## 2022-05-02 LAB
INR PPP: 1 (ref ?–1.2)
PLATELET # BLD AUTO: 412 K/UL (ref 150–450)
PT BLD: 11.2 SEC (ref 9–12)

## 2022-05-02 PROCEDURE — 88305 TISSUE EXAM BY PATHOLOGIST: CPT

## 2022-05-02 PROCEDURE — 32408 CORE NDL BX LNG/MED PERQ: CPT

## 2022-05-02 PROCEDURE — 71045 X-RAY EXAM CHEST 1 VIEW: CPT

## 2022-05-02 PROCEDURE — 36415 COLL VENOUS BLD VENIPUNCTURE: CPT

## 2022-05-02 PROCEDURE — 85610 PROTHROMBIN TIME: CPT

## 2022-05-02 PROCEDURE — 85049 AUTOMATED PLATELET COUNT: CPT

## 2022-05-02 NOTE — CT
EXAMINATION TYPE: CT biopsy lung RT

 

DATE OF EXAM: 5/2/2022

 

COMPARISON: 4/6/2022

 

HISTORY: Right posterior pleural-based mass

 

CT DLP: 914 mGycm

 

The procedure is discussed with the patient, the risks, complications, benefits and alternatives, wer
e discussed and any questions were answered.  Informed consent was obtained.  The patient is placed p
debbi on the CT table, prepped and draped in the usual sterile fashion.  

 

Utilizing a 20 H core biopsy needle access into the requested mass was achieved with 3 samples obtain
ed.  Pathology pending.  All elements of maximal barrier and sterile technique were utilized.  The pa
tient remained stable throughout the procedure with no immediate postprocedural complication.  

 

IMPRESSION:

1.   Successful CT guided fine core biopsy pleural-based right lung mass.

## 2022-05-02 NOTE — XR
EXAMINATION TYPE: XR chest 1V portable

 

DATE OF EXAM: 5/2/2022

 

COMPARISON: 5/2/2022

 

HISTORY: Post lung biopsy

 

TECHNIQUE: Single frontal view of the chest is obtained.

 

FINDINGS:  Right-sided consolidation and small effusion noted. Multiple bilateral pulmonary masses wood
spicious for malignancy. Numerous cutaneous lesions compatible with history of neurofibromatosis. No 
sizable pneumothorax. Arthropathy of the shoulders. Heart size normal. Atherosclerotic change aorta.

 

IMPRESSION:  

1. No sizable pneumothorax post lung biopsy.

## 2022-05-02 NOTE — XR
EXAMINATION TYPE: XR chest 1V portable

 

DATE OF EXAM: 5/2/2022

 

COMPARISON: 4/11/2022

 

HISTORY: Post right lung biopsy

 

TECHNIQUE: Single frontal view of the chest is obtained.

 

FINDINGS:  Right-sided consolidation and pleural effusion with bilateral pulmonary masses. Apical ple
ural thickening but no pneumothorax. Multiple cutaneous lesions compatible the patient's history of n
eurofibromatosis. Arthropathy of the shoulders.

 

IMPRESSION:  No sizable pneumothorax post lung biopsy.